# Patient Record
Sex: FEMALE | Race: WHITE | NOT HISPANIC OR LATINO | Employment: FULL TIME | URBAN - METROPOLITAN AREA
[De-identification: names, ages, dates, MRNs, and addresses within clinical notes are randomized per-mention and may not be internally consistent; named-entity substitution may affect disease eponyms.]

---

## 2017-01-30 ENCOUNTER — OFFICE VISIT - RIVER FALLS (OUTPATIENT)
Dept: FAMILY MEDICINE | Facility: CLINIC | Age: 22
End: 2017-01-30

## 2017-04-03 ENCOUNTER — OFFICE VISIT - RIVER FALLS (OUTPATIENT)
Dept: FAMILY MEDICINE | Facility: CLINIC | Age: 22
End: 2017-04-03

## 2017-04-03 ASSESSMENT — MIFFLIN-ST. JEOR: SCORE: 1587.5

## 2017-04-10 ENCOUNTER — TRANSFERRED RECORDS (OUTPATIENT)
Dept: HEALTH INFORMATION MANAGEMENT | Facility: CLINIC | Age: 22
End: 2017-04-10

## 2017-04-10 LAB — HPV ABSTRACT: ABNORMAL

## 2017-04-17 ENCOUNTER — OFFICE VISIT - RIVER FALLS (OUTPATIENT)
Dept: FAMILY MEDICINE | Facility: CLINIC | Age: 22
End: 2017-04-17

## 2017-06-28 ENCOUNTER — OFFICE VISIT - RIVER FALLS (OUTPATIENT)
Dept: FAMILY MEDICINE | Facility: CLINIC | Age: 22
End: 2017-06-28

## 2017-06-28 ASSESSMENT — MIFFLIN-ST. JEOR: SCORE: 1552.35

## 2017-09-21 ENCOUNTER — OFFICE VISIT - RIVER FALLS (OUTPATIENT)
Dept: FAMILY MEDICINE | Facility: CLINIC | Age: 22
End: 2017-09-21

## 2017-09-21 ASSESSMENT — MIFFLIN-ST. JEOR: SCORE: 1418.76

## 2017-10-24 ENCOUNTER — AMBULATORY - RIVER FALLS (OUTPATIENT)
Dept: FAMILY MEDICINE | Facility: CLINIC | Age: 22
End: 2017-10-24

## 2018-05-14 ENCOUNTER — OFFICE VISIT - RIVER FALLS (OUTPATIENT)
Dept: FAMILY MEDICINE | Facility: CLINIC | Age: 23
End: 2018-05-14

## 2018-05-14 ASSESSMENT — MIFFLIN-ST. JEOR: SCORE: 1552.92

## 2019-06-03 ENCOUNTER — OFFICE VISIT - RIVER FALLS (OUTPATIENT)
Dept: FAMILY MEDICINE | Facility: CLINIC | Age: 24
End: 2019-06-03

## 2019-06-03 ASSESSMENT — MIFFLIN-ST. JEOR: SCORE: 1532.96

## 2020-07-15 ENCOUNTER — OFFICE VISIT - RIVER FALLS (OUTPATIENT)
Dept: FAMILY MEDICINE | Facility: CLINIC | Age: 25
End: 2020-07-15

## 2020-07-15 ASSESSMENT — MIFFLIN-ST. JEOR: SCORE: 1586.37

## 2020-07-16 ENCOUNTER — COMMUNICATION - RIVER FALLS (OUTPATIENT)
Dept: FAMILY MEDICINE | Facility: CLINIC | Age: 25
End: 2020-07-16

## 2020-07-16 LAB
BUN SERPL-MCNC: 7 MG/DL (ref 7–25)
BUN/CREAT RATIO - HISTORICAL: NORMAL (ref 6–22)
CALCIUM SERPL-MCNC: 9 MG/DL (ref 8.6–10.2)
CHLAMYDIA TRACHOMATIS RNA, TMA - QUEST: NOT DETECTED
CHLORIDE BLD-SCNC: 104 MMOL/L (ref 98–110)
CO2 SERPL-SCNC: 29 MMOL/L (ref 20–32)
CREAT SERPL-MCNC: 0.69 MG/DL (ref 0.5–1.1)
EGFRCR SERPLBLD CKD-EPI 2021: 121 ML/MIN/1.73M2
GLUCOSE BLD-MCNC: 83 MG/DL (ref 65–139)
NEISSERIA GONORRHOEAE RNA TMA: NOT DETECTED
POTASSIUM BLD-SCNC: 4.2 MMOL/L (ref 3.5–5.3)
SODIUM SERPL-SCNC: 139 MMOL/L (ref 135–146)

## 2020-11-02 ENCOUNTER — OFFICE VISIT - RIVER FALLS (OUTPATIENT)
Dept: FAMILY MEDICINE | Facility: CLINIC | Age: 25
End: 2020-11-02

## 2020-11-02 ENCOUNTER — COMMUNICATION - RIVER FALLS (OUTPATIENT)
Dept: FAMILY MEDICINE | Facility: CLINIC | Age: 25
End: 2020-11-02

## 2020-11-03 ENCOUNTER — AMBULATORY - RIVER FALLS (OUTPATIENT)
Dept: FAMILY MEDICINE | Facility: CLINIC | Age: 25
End: 2020-11-03

## 2021-06-29 ENCOUNTER — OFFICE VISIT - RIVER FALLS (OUTPATIENT)
Dept: FAMILY MEDICINE | Facility: CLINIC | Age: 26
End: 2021-06-29

## 2021-06-29 ENCOUNTER — AMBULATORY - RIVER FALLS (OUTPATIENT)
Dept: FAMILY MEDICINE | Facility: CLINIC | Age: 26
End: 2021-06-29

## 2021-06-29 ENCOUNTER — COMMUNICATION - RIVER FALLS (OUTPATIENT)
Dept: FAMILY MEDICINE | Facility: CLINIC | Age: 26
End: 2021-06-29

## 2021-06-30 LAB — SARS-COV-2 RNA RESP QL NAA+PROBE: POSITIVE

## 2021-09-13 ENCOUNTER — OFFICE VISIT - RIVER FALLS (OUTPATIENT)
Dept: FAMILY MEDICINE | Facility: CLINIC | Age: 26
End: 2021-09-13

## 2021-09-13 ASSESSMENT — MIFFLIN-ST. JEOR: SCORE: 1562.1

## 2021-09-14 LAB
BUN SERPL-MCNC: 8 MG/DL (ref 7–25)
BUN/CREAT RATIO - HISTORICAL: ABNORMAL (ref 6–22)
CALCIUM SERPL-MCNC: 10 MG/DL (ref 8.6–10.2)
CHLORIDE BLD-SCNC: 102 MMOL/L (ref 98–110)
CO2 SERPL-SCNC: 27 MMOL/L (ref 20–32)
CREAT SERPL-MCNC: 0.64 MG/DL (ref 0.5–1.1)
EGFRCR SERPLBLD CKD-EPI 2021: 123 ML/MIN/1.73M2
ERYTHROCYTE [DISTWIDTH] IN BLOOD BY AUTOMATED COUNT: 13.1 % (ref 11–15)
GLUCOSE BLD-MCNC: 95 MG/DL (ref 65–99)
HCT VFR BLD AUTO: 42.8 % (ref 35–45)
HGB BLD-MCNC: 14 GM/DL (ref 11.7–15.5)
MCH RBC QN AUTO: 28.6 PG (ref 27–33)
MCHC RBC AUTO-ENTMCNC: 32.7 GM/DL (ref 32–36)
MCV RBC AUTO: 87.3 FL (ref 80–100)
PLATELET # BLD AUTO: 372 10*3/UL (ref 140–400)
PMV BLD: 10.9 FL (ref 7.5–12.5)
POTASSIUM BLD-SCNC: 5.4 MMOL/L (ref 3.5–5.3)
RBC # BLD AUTO: 4.9 10*6/UL (ref 3.8–5.1)
SODIUM SERPL-SCNC: 139 MMOL/L (ref 135–146)
TSH SERPL DL<=0.005 MIU/L-ACNC: 1.26 MIU/L
WBC # BLD AUTO: 9.1 10*3/UL (ref 3.8–10.8)

## 2021-09-15 ENCOUNTER — COMMUNICATION - RIVER FALLS (OUTPATIENT)
Dept: FAMILY MEDICINE | Facility: CLINIC | Age: 26
End: 2021-09-15

## 2021-09-30 ENCOUNTER — AMBULATORY - RIVER FALLS (OUTPATIENT)
Dept: FAMILY MEDICINE | Facility: CLINIC | Age: 26
End: 2021-09-30

## 2021-10-01 ENCOUNTER — COMMUNICATION - RIVER FALLS (OUTPATIENT)
Dept: FAMILY MEDICINE | Facility: CLINIC | Age: 26
End: 2021-10-01

## 2021-10-01 LAB — POTASSIUM BLD-SCNC: 4.8 MMOL/L (ref 3.5–5.3)

## 2021-10-11 ENCOUNTER — COMMUNICATION - RIVER FALLS (OUTPATIENT)
Dept: FAMILY MEDICINE | Facility: CLINIC | Age: 26
End: 2021-10-11

## 2021-10-14 ENCOUNTER — AMBULATORY - RIVER FALLS (OUTPATIENT)
Dept: FAMILY MEDICINE | Facility: CLINIC | Age: 26
End: 2021-10-14

## 2021-10-20 ENCOUNTER — OFFICE VISIT - RIVER FALLS (OUTPATIENT)
Dept: FAMILY MEDICINE | Facility: CLINIC | Age: 26
End: 2021-10-20

## 2021-10-21 ENCOUNTER — COMMUNICATION - RIVER FALLS (OUTPATIENT)
Dept: FAMILY MEDICINE | Facility: CLINIC | Age: 26
End: 2021-10-21

## 2021-10-21 LAB
BUN SERPL-MCNC: 10 MG/DL (ref 7–25)
BUN/CREAT RATIO - HISTORICAL: NORMAL (ref 6–22)
CALCIUM SERPL-MCNC: 9.2 MG/DL (ref 8.6–10.2)
CHLORIDE BLD-SCNC: 101 MMOL/L (ref 98–110)
CO2 SERPL-SCNC: 29 MMOL/L (ref 20–32)
CREAT SERPL-MCNC: 0.73 MG/DL (ref 0.5–1.1)
EGFRCR SERPLBLD CKD-EPI 2021: 114 ML/MIN/1.73M2
ERYTHROCYTE [DISTWIDTH] IN BLOOD BY AUTOMATED COUNT: 12.7 % (ref 11–15)
GLUCOSE BLD-MCNC: 89 MG/DL (ref 65–99)
HCT VFR BLD AUTO: 41 % (ref 35–45)
HGB BLD-MCNC: 13.6 GM/DL (ref 11.7–15.5)
MCH RBC QN AUTO: 29.7 PG (ref 27–33)
MCHC RBC AUTO-ENTMCNC: 33.2 GM/DL (ref 32–36)
MCV RBC AUTO: 89.5 FL (ref 80–100)
PLATELET # BLD AUTO: 274 10*3/UL (ref 140–400)
PMV BLD: 11.3 FL (ref 7.5–12.5)
POTASSIUM BLD-SCNC: 4.9 MMOL/L (ref 3.5–5.3)
RBC # BLD AUTO: 4.58 10*6/UL (ref 3.8–5.1)
SODIUM SERPL-SCNC: 137 MMOL/L (ref 135–146)
TSH SERPL DL<=0.005 MIU/L-ACNC: 1.46 MIU/L
WBC # BLD AUTO: 9.6 10*3/UL (ref 3.8–10.8)

## 2022-01-05 ENCOUNTER — COMMUNICATION - RIVER FALLS (OUTPATIENT)
Dept: FAMILY MEDICINE | Facility: CLINIC | Age: 27
End: 2022-01-05

## 2022-02-12 VITALS
HEIGHT: 64 IN | WEIGHT: 155.8 LBS | BODY MASS INDEX: 26.6 KG/M2 | DIASTOLIC BLOOD PRESSURE: 89 MMHG | SYSTOLIC BLOOD PRESSURE: 123 MMHG | HEART RATE: 101 BPM | OXYGEN SATURATION: 98 %

## 2022-02-12 VITALS
TEMPERATURE: 97.4 F | HEART RATE: 80 BPM | BODY MASS INDEX: 34.04 KG/M2 | WEIGHT: 195.2 LBS | SYSTOLIC BLOOD PRESSURE: 116 MMHG | DIASTOLIC BLOOD PRESSURE: 82 MMHG

## 2022-02-12 VITALS
HEIGHT: 63 IN | WEIGHT: 183.6 LBS | BODY MASS INDEX: 32.53 KG/M2 | SYSTOLIC BLOOD PRESSURE: 124 MMHG | DIASTOLIC BLOOD PRESSURE: 70 MMHG | HEART RATE: 70 BPM

## 2022-02-12 VITALS
HEIGHT: 63 IN | DIASTOLIC BLOOD PRESSURE: 62 MMHG | HEART RATE: 88 BPM | BODY MASS INDEX: 33.31 KG/M2 | WEIGHT: 188 LBS | SYSTOLIC BLOOD PRESSURE: 110 MMHG

## 2022-02-12 VITALS
TEMPERATURE: 98.8 F | HEART RATE: 80 BPM | BODY MASS INDEX: 31.63 KG/M2 | WEIGHT: 185.25 LBS | DIASTOLIC BLOOD PRESSURE: 72 MMHG | SYSTOLIC BLOOD PRESSURE: 116 MMHG | HEIGHT: 64 IN

## 2022-02-12 VITALS
WEIGHT: 193 LBS | SYSTOLIC BLOOD PRESSURE: 118 MMHG | HEART RATE: 88 BPM | DIASTOLIC BLOOD PRESSURE: 72 MMHG | BODY MASS INDEX: 32.95 KG/M2 | TEMPERATURE: 98.8 F | HEIGHT: 64 IN

## 2022-02-12 VITALS
HEART RATE: 88 BPM | SYSTOLIC BLOOD PRESSURE: 130 MMHG | BODY MASS INDEX: 32.68 KG/M2 | DIASTOLIC BLOOD PRESSURE: 68 MMHG | SYSTOLIC BLOOD PRESSURE: 122 MMHG | OXYGEN SATURATION: 99 % | HEART RATE: 97 BPM | HEIGHT: 64 IN | TEMPERATURE: 98 F | DIASTOLIC BLOOD PRESSURE: 72 MMHG | BODY MASS INDEX: 31.99 KG/M2 | WEIGHT: 187.4 LBS | OXYGEN SATURATION: 99 % | HEIGHT: 64 IN

## 2022-02-12 VITALS — BODY MASS INDEX: 32.79 KG/M2 | HEIGHT: 64 IN

## 2022-02-12 VITALS
DIASTOLIC BLOOD PRESSURE: 70 MMHG | SYSTOLIC BLOOD PRESSURE: 100 MMHG | BODY MASS INDEX: 32.61 KG/M2 | HEART RATE: 66 BPM | HEIGHT: 64 IN | WEIGHT: 191 LBS

## 2022-02-15 NOTE — TELEPHONE ENCOUNTER
---------------------  From: Marizol Piedra RN   Sent: 9/29/2021 3:27:33 PM CDT  Subject: labs     Time of Call:  1510  Return call at:1526     Person Calling:  patient  Phone number:      Note:   Patient dose not need to fast for lab on 9/30/21. Due for potassium.

## 2022-02-15 NOTE — TELEPHONE ENCOUNTER
---------------------  From: Lauren Rizvi CMA   Sent: 11/3/2020 2:20:07 PM CST  [facility]     Pt here for covid testing at [facility] today per Haley Esquivel. 02 Sat=98%. Specimen sent to [facility]. Faxed form to [facility].Correction: Specimen sent to state lab not [facility].

## 2022-02-15 NOTE — LETTER
(Inserted Image. Unable to display)   May 15, 2019      KEVIN VILLA   [place], [place] [id number]        Dear KEVIN,      Thank you for selecting [facility] (previously [facility]) for your healthcare needs.     Our records indicate you are due for the following services:     Annual Physical    To schedule an appointment or if you have further questions, please contact your primary clinic:   [facility]          [phone]   [facility]    [phone]             [facility]         [phone]      Powered by [employer]    Sincerely,    Geovanni Tyler M.D.

## 2022-02-15 NOTE — PROGRESS NOTES
Patient:   KEVIN VILLA            MRN: [id number]            FIN: [id number]               Age:   25 years     Sex:  Female     :  1995   Associated Diagnoses:   Close exposure to 2019-nCoV; Tick-borne disease   Author:   Robert ZAZUETA, Rishi ESPINOSA      Visit Information      Date of Service: 2021 01:05 pm  Performing Location: [facility]  Encounter#: [id number]      Primary Care Provider (PCP):  Geovanni Tyler MD    NPI# [id number]   Visit type:  Video Visit via [employer] or [employer].    Participants in room during visit:  1_   Location of patient:  _home  Location of provider:  _ (Clinic office   Video Start Time:  _140  Video End Time:   _141    Today's visit was conducted via video conference due to the COVID-19 pandemic.  The patient's consent to proceed with a video visit has been obtained and documented.      Chief Complaint   2021 1:27 PM CDT    Verbal consent given for a video visit.  Pt is c/o body aches,chills,headache,fatigue and lightheadedness x 5 days        History of Present Illness   Patient is a _25 year old _female who is being evaluated via a billable video visit.  5 day hx of body aches, chills, headache, fatigue, lightheadedness      she had Covid-19 in 2020  she completed the Pfizer Covid vaccine series in 2021  she is a CNA at [employer]    no known tick exposure,  [place]      Review of Systems   Constitutional:  Chills, Sweats, Fatigue, body aches.    Respiratory:  Negative.    Gastrointestinal:  Negative.    Neurologic:  Headache, lightheadness.       Health Status   Allergies:    Allergic Reactions (Selected)  No Known Medication Allergies   Medications:  (Selected)   Documented Medications  Documented  MulitVitamin: MulitVitamin, 0 Refill(s), Type: Maintenance   Problem list:    All Problems  ASCUS with positive high risk HPV cervical / SNOMED CT 4125576752 / Confirmed  Obesity / SNOMED CT 9069648469 /  Probable  Menarche / ICD-9-CM V21.8 / Confirmed      Histories   Past Medical History:    Active  Menarche (V21.8): Onset in the month of 3/2007 at 11 years  Obesity (0870676487)  ASCUS with positive high risk HPV cervical (5134128208)   Family History:    Squamous cell carcinoma  Grandfather (P) (General Family Hx)  Basal cell carcinoma  Grandfather (P) (General Family Hx)  Coronary artery disease  Grandfather (M) (General Family Hx)     Procedure history:    No active procedure history items have been selected or recorded.   Social History:        Electronic Cigarette/Vaping Assessment            Electronic Cigarette Use: Never.      Alcohol Assessment: Current            Beer (12 oz), Wine (5 oz), 1-2 times per week, 3 drinks/episode average.  4 drinks/episode maximum.      Tobacco Assessment: Denies Tobacco Use            Never            Never (less than 100 in lifetime)      Substance Abuse Assessment: Denies Substance Abuse            Never      Employment and Education Assessment            Employed.  Work/School description: CNA.  Activity level: Moderate physical work.      Home and Environment Assessment            Marital status: Single.  Lives with Family.  Injuries/Abuse/Neglect in household: No.  Feels unsafe at home:               No.  Family/Friends available for support: Yes.      Nutrition and Health Assessment            Type of diet: Regular.  Wants to lose weight: Yes.  Sleeping concerns: Yes.      Exercise and Physical Activity Assessment: Occasional exercise            Exercise duration: 30.  Exercise frequency: 3-4 times/week.  Exercise type: Running, Walking.      Sexual Assessment            Sexually active: No.  Sexual orientation: Heterosexual.  Other sexual concerns: Birth control.            Identifies as female, History of STD: Yes.  Contraceptive Use Details: Birth control pill.  History of sexual               abuse: No.      Other Assessment            First menses age 11.  Regular  menses, duration 3 days.  No history of abnormal Pap smear.        Physical Examination   General:  No acute distress.    Respiratory:  Respirations are non-labored.    Psychiatric:  Cooperative, Appropriate mood & affect, Normal judgment.       Impression and Plan   Diagnosis     Close exposure to 2019-nCoV (MLK59-BP Z20.822).     Tick-borne disease (APS50-OV B88.2).     Summary:  will treat as tick borne illness with doxycycline for 2 weeks  will also test for Covid (but seems unlikely due to prior infection and being vaccinated), no work until negative Covid in known  follow up if not improving.    Orders     Orders   Pharmacy:  doxycycline hyclate 100 mg oral tablet (Prescribe): = 1 tab(s) ( 100 mg ), PO, bid, x 14 day(s), Instructions: may substitute for cheapest form of doxycycline, # 28 tab(s), 0 Refill(s), Type: Maintenance, Pharmacy: [facility], 1 tab(s) Oral bid,x14 day(s),Instr:may substitute for cheapest form of doxycycline, 64, in, 11/2/2020 3:58 PM CST, Height Measured, 191, lb, 7/15/2020 11:05 AM CDT, Weight Measured.     Orders   Requests (Lab):  SARS-CoV-2 RNA (COVID-19), Qualitative NAAT (Request) (Order): Close exposure to 2019-nCoV.     Orders   Charges (Evaluation and Management):  50916 office o/p est low 20-29 min (Charge) (Order): Quantity: 1, Close exposure to 2019-nCoV  Tick-borne disease.

## 2022-02-15 NOTE — PROGRESS NOTES
Patient:   KEVIN VILLA            MRN: [id number]            FIN: [id number]               Age:   22 years     Sex:  Female     :  1995   Associated Diagnoses:   Well adult exam   Author:   Geovanni Tyler MD      Visit Information   Visit type:  Annual exam.    Source of history:  Self.    History limitation:  None.       Chief Complaint   2018 4:09 PM CDT    Annual Exam      Well Adult History   Well Adult History             The patient presents for well adult exam.  The patient's general health status is described as good.  would like to try switching ocp to see if effective for acne which has been worsening.  The patient's diet is described as balanced.  Exercise: routine.  Associated symptoms consist of none.  Medical encounters: none.        Review of Systems   ROS reviewed as documented in chart      Health Status   Allergies:    Allergic Reactions (Selected)  No Known Medication Allergies   Medications:  (Selected)   Prescriptions  Prescribed  drospirenone-ethinyl estradiol 3 mg-0.02 mg oral tablet: 1 tab(s), po, daily, # 84 tab(s), 4 Refill(s), [facility], 1 tab(s) po daily   Problem list:    All Problems  ASCUS with positive high risk HPV cervical / SNOMED CT 1658891719 / Confirmed  Menarche / ICD-9-CM V21.8 / Confirmed  Obesity / SNOMED CT 3649343627 / Probable  Canceled: Chicken Pox / ICD-9-.9      Histories   Past Medical History:    Active  Menarche (ICD-9-CM V21.8): Onset in the month of 3/2007 at 11 years   Family History:    Squamous cell carcinoma  Grandfather (P) (General Family Hx)  Basal cell carcinoma  Grandfather (P) (General Family Hx)  Coronary artery disease  Grandfather (M) (General Family Hx)     Procedure history:    No active procedure history items have been selected or recorded.   Social History:        Alcohol Assessment            2 x's per week      Tobacco Assessment: Denies Tobacco Use            Never      Substance  Abuse Assessment: Denies Substance Abuse            Never      Employment and Education Assessment            Student      Home and Environment Assessment            Lives with Roomate(s)/Friend(s).  Living situation: Home/Independent.      Nutrition and Health Assessment            Type of diet: Regular.      Exercise and Physical Activity Assessment: Occasional exercise            Exercise type: Walking.      Sexual Assessment            Sexually active: No.  Sexual orientation: Heterosexual.  Other sexual concerns: Birth control.      Other Assessment            First menses age 11.  Regular menses, duration 3 days.  No history of abnormal Pap smear.        Physical Examination   Last Menstrual Period: 4/21/2018   Vital Signs   5/14/2018 4:09 PM CDT Peripheral Pulse Rate 88 bpm    Pulse Site Radial artery    HR Method Manual    Systolic Blood Pressure 110 mmHg    Diastolic Blood Pressure 62 mmHg    Mean Arterial Pressure 78 mmHg    BP Site Left arm    BP Method Manual      Measurements from flowsheet : Measurements   5/14/2018 4:09 PM CDT Height Measured - Standard 62.75 in    Weight Measured - Standard 188 lb    BSA 1.94 m2    Body Mass Index 33.56 kg/m2  HI      General:  Alert and oriented, No acute distress.    Eye:  Pupils are equal, round and reactive to light, Extraocular movements are intact, Normal conjunctiva.    HENT:  Normocephalic, Tympanic membranes are clear, Oral mucosa is moist, No pharyngeal erythema.    Neck:  Supple, Non-tender, No lymphadenopathy, No thyromegaly.    Respiratory:  Lungs are clear to auscultation.    Cardiovascular:  Normal rate, Regular rhythm.    Breast:  No mass, No tenderness, No discharge.    Gastrointestinal:  Soft, Non-tender, Non-distended, No organomegaly.    Genitourinary:  Normal genitalia for age and sex, No urethral discharge, No lesions.         Perineum: Within normal limits.         Vagina: Within normal limits.         Uterus: Within normal limits.          Ovaries: Within normal limits.         Adnexa: Within normal limits.    Musculoskeletal:  Normal range of motion, Normal strength.    Integumentary:  Warm, Dry, Pink, No rash, no concerning lesions.    Neurologic:  Alert, Oriented, Normal sensory.    Psychiatric:  Cooperative, Appropriate mood & affect.       Impression and Plan   Diagnosis     Well adult exam (NDZ55-GS Z00.00).     Course:  Progressing as expected.    Patient Instructions:       Counseled: Patient, BMI, diet, and exercise.    Orders     Orders (Selected)   Outpatient Orders  Ordered  Return to Clinic (Request): RFV: Annual Exam, Return in 1 year  Ordered (In Transit)  Chlamydia/Neisseria gonorrhoeae RNA, TMA* ([facility]): Specimen Type: Swab, Collection Date: 05/14/18 17:01:00 CDT  ThinPrep Imaging Pap reflex HPV mRNA E6/E7* ([facility]): Specimen Type: Pap, Collection Date: 05/14/18 16:49:00 CDT  Prescriptions  Prescribed  Ortho Tri-Cyclen oral tablet: 1 tab(s), PO, Daily, # 84 tab(s), 4 Refill(s), Type: Maintenance, [facility], 1 tab(s) po daily.

## 2022-02-15 NOTE — PROGRESS NOTES
Patient:   KEVIN VILLA            MRN: [id number]            FIN: [id number]               Age:   21 years     Sex:  Female     :  1995   Associated Diagnoses:   ASCUS with positive high risk HPV cervical   Author:   Geovanni Tyler MD      CC: abnormal pap follow up    Patient with first abnl pap -   ASCUS with HR HPV   See form for further history.       Physical Examination   Genitourinary:  No costovertebral angle tenderness, No inguinal tenderness, No urethral discharge, No lesions.       Procedure   Colposcopy procedure   Confirmed: patient.     Informed consent: signed by patient.     Preparation and technique: placed in lithotomy position, vaginal speculum inserted, colposcope placed, cervix inspected, cervix swabbed (with saline solution, with acetic acid solution), cervix re-inspected color changes after acetic acid applied from red to white.     Findings: abnormal colposcopic lesion noted (with white epithelium (with no visible vessels, with sharp border, visible after acetic acid applied), not with atypical blood vessels).     Procedure tolerated: well.     Complications: none.        Impression and Plan   Diagnosis     ASCUS with positive high risk HPV cervical (HIR03-NZ R87.610).     Orders     Orders   Requests (Return to Office):  Return to Clinic (Request) (Modify): RFV: Annual exam with pap smear to follow up ASCUS, Return in 1 year.

## 2022-02-15 NOTE — TELEPHONE ENCOUNTER
---------------------  From: Kirk Ybarra MD   To: KEVIN VILLA    Sent: 10/21/2021 11:00:37 AM CDT  Subject: General Message     Your blood tests are looking good      Results:  Date Result Name Value Ref Range   10/20/2021 10:06 AM Glucose Level 89 mg/dL (65 - 99)   10/20/2021 10:06 AM BUN 10 mg/dL (7 - 25)   10/20/2021 10:06 AM Creatinine Level 0.73 mg/dL (0.50 - 1.10)   10/20/2021 10:06 AM eGFR 114 mL/min/1.73m2 (> OR = 60 - )   10/20/2021 10:06 AM eGFR African American 132 mL/min/1.73m2 (> OR = 60 - )   10/20/2021 10:06 AM BUN/Creat Ratio NOT APPLICABLE (6 - 22)   10/20/2021 10:06 AM Sodium Level 137 mmol/L (135 - 146)   10/20/2021 10:06 AM Potassium Level 4.9 mmol/L (3.5 - 5.3)   10/20/2021 10:06 AM Chloride Level 101 mmol/L (98 - 110)   10/20/2021 10:06 AM CO2 Level 29 mmol/L (20 - 32)   10/20/2021 10:06 AM Calcium Level 9.2 mg/dL (8.6 - 10.2)   10/20/2021 10:06 AM WBC 9.6 (3.8 - 10.8)   10/20/2021 10:06 AM RBC 4.58 (3.80 - 5.10)   10/20/2021 10:06 AM Hgb 13.6 gm/dL (11.7 - 15.5)   10/20/2021 10:06 AM Hct 41.0 % (35.0 - 45.0)   10/20/2021 10:06 AM MCV 89.5 fL (80.0 - 100.0)   10/20/2021 10:06 AM MCH 29.7 pg (27.0 - 33.0)   10/20/2021 10:06 AM MCHC 33.2 gm/dL (32.0 - 36.0)   10/20/2021 10:06 AM RDW 12.7 % (11.0 - 15.0)   10/20/2021 10:06 AM Platelet 274 (140 - 400)   10/20/2021 10:06 AM MPV 11.3 fL (7.5 - 12.5)   10/20/2021 10:06 AM TSH 1.46 mIU/L

## 2022-02-15 NOTE — TELEPHONE ENCOUNTER
---------------------  From: Geovanni Tyler MD   To: SWATI Burnett [place] ([id number]); KEVIN VILLA    Sent: 9/15/2021 10:01:01 AM CDT  Subject: lab results     Kevin  Labs are listed below. Potassium is just above normal. This is not likely significant, but I would like for you to recheck a potassium again in 3-4 weeks to make sure that it is stable. Otherwise, labs are all normal. TSH is in range. Let me know what questions you have.  Felecia Tyler      Results:  Date Result Name Ind Value Ref Range   9/13/2021 1:13 PM Sodium Level  139 mmol/L (135 - 146)   9/13/2021 1:13 PM Potassium Level ((H)) 5.4 mmol/L (3.5 - 5.3)   9/13/2021 1:13 PM Chloride Level  102 mmol/L (98 - 110)   9/13/2021 1:13 PM CO2 Level  27 mmol/L (20 - 32)   9/13/2021 1:13 PM Glucose Level  95 mg/dL (65 - 99)   9/13/2021 1:13 PM BUN  8 mg/dL (7 - 25)   9/13/2021 1:13 PM Creatinine Level  0.64 mg/dL (0.50 - 1.10)   9/13/2021 1:13 PM BUN/Creat Ratio  NOT APPLICABLE (6 - 22)   9/13/2021 1:13 PM eGFR  123 mL/min/1.73m2 (> OR = 60 - )   9/13/2021 1:13 PM eGFR African American  143 mL/min/1.73m2 (> OR = 60 - )   9/13/2021 1:13 PM Calcium Level  10.0 mg/dL (8.6 - 10.2)   9/13/2021 1:13 PM TSH  1.26 mIU/L    9/13/2021 1:13 PM WBC  9.1 (3.8 - 10.8)   9/13/2021 1:13 PM RBC  4.90 (3.80 - 5.10)   9/13/2021 1:13 PM Hgb  14.0 gm/dL (11.7 - 15.5)   9/13/2021 1:13 PM Hct  42.8 % (35.0 - 45.0)   9/13/2021 1:13 PM MCV  87.3 fL (80.0 - 100.0)   9/13/2021 1:13 PM MCH  28.6 pg (27.0 - 33.0)   9/13/2021 1:13 PM MCHC  32.7 gm/dL (32.0 - 36.0)   9/13/2021 1:13 PM RDW  13.1 % (11.0 - 15.0)   9/13/2021 1:13 PM Platelet  372 (140 - 400)   9/13/2021 1:13 PM MPV  10.9 fL (7.5 - 12.5)

## 2022-02-15 NOTE — PROCEDURES
Accession Number:       [id number]  CLINICAL INFORMATION::     None given  LMP::     06/20/2020  PREV. PAP::     05/14/2018  PREV. BX::     NONE GIVEN  SOURCE::     [facility]  STATEMENT OF ADEQUACY::     Satisfactory for evaluation. Endocervical/transformation zone component present.  INTERPRETATION/RESULT::     Negative for intraepithelial lesion or malignancy.  INFECTION::     Fungal organisms morphologically consistent with Candida spp.  COMMENT::     This Pap test has been evaluated with computer assisted technology.  CYTOTECHNOLOGIST::     MUMTAZ HUFF(ASCP) CT Screening location: [place] [id number]  COMMENT:     See comment       EXPLANATORY NOTE:         The Pap is a screening test for cervical cancer. It is       not a diagnostic test and is subject to false negative       and false positive results. It is most reliable when a       satisfactory sample, regularly obtained, is submitted       with relevant clinical findings and history, and when       the Pap result is evaluated along with historic and       current clinical information.

## 2022-02-15 NOTE — TELEPHONE ENCOUNTER
---------------------  From: Shawna/Fang GLORIA (Phone Messages Pool ([id number]_[facility]))   Sent: 7/2/2021 3:02:53 PM CDT  Subject: General Message     Phone Message    PCP: SWATI    Time of Call: 1500    Phone Number: [phone]    Returned call at: n/a    Note: Patient calls stating that she tested positive for covid and is needing the result sent to her and it also needs to specify what test was done. She requests us to sent the note via [employer].     I wrote note and sent via portal.    Patient will call and let us know if she does not receive it via portal and will come pick it up at .

## 2022-02-15 NOTE — PROGRESS NOTES
Patient:   KEVIN VILLA            MRN: [id number]            FIN: [id number]               Age:   23 years     Sex:  Female     :  1995   Associated Diagnoses:   Well adult exam   Author:   Geovanni Tyler MD      Visit Information   Visit type:  Annual exam.    Source of history:  Self.    History limitation:  None.       Chief Complaint   6/3/2019 10:16 AM CDT    Physical        Well Adult History   Well Adult History             The patient presents for well adult exam.  The patient's general health status is described as good.  The patient's diet is described as balanced.  Associated symptoms consist of none.  Medical encounters: none.     Patient with hx of ASCUS with +HPV. Most recent pap last year normal. Plan recheck in one year. Due for OCP refill, working well.      Review of Systems   ROS reviewed as documented in chart      Health Status   Allergies:    Allergic Reactions (Selected)  No Known Medication Allergies   Medications:  (Selected)   Prescriptions  Prescribed  Tri-Linyah 35 mcg oral tablet: 1 tab(s), Oral, daily, # 84 tab(s), 4 Refill(s), Type: Maintenance, [facility], 1 tab(s) Oral daily   Problem list:    All Problems  ASCUS with positive high risk HPV cervical / SNOMED CT 3078682110 / Confirmed  Menarche / ICD-9-CM V21.8 / Confirmed  Obesity / SNOMED CT 6905117789 / Probable  Canceled: Chicken Pox / ICD-9-.9      Histories   Past Medical History:    Active  Menarche (ICD-9-CM V21.8): Onset in the month of 3/2007 at 11 years  Obesity (SNOMED CT 2527622106)  ASCUS with positive high risk HPV cervical (SNOMED CT 7691881991)   Family History:    Squamous cell carcinoma  Grandfather (P) (General Family Hx)  Basal cell carcinoma  Grandfather (P) (General Family Hx)  Coronary artery disease  Grandfather (M) (General Family Hx)     Procedure history:    No active procedure history items have been selected or recorded.   Social History:         Alcohol Assessment: Current            2 x's per week, 3 drinks/episode average.      Tobacco Assessment: Denies Tobacco Use            Never      Substance Abuse Assessment: Denies Substance Abuse            Never      Employment and Education Assessment            Work/School description: .      Home and Environment Assessment            Lives with Roomate(s)/Friend(s).  Living situation: Home/Independent.      Nutrition and Health Assessment            Type of diet: Regular.      Exercise and Physical Activity Assessment: Occasional exercise            Exercise type: Walking.      Sexual Assessment            Sexually active: No.  Sexual orientation: Heterosexual.  Other sexual concerns: Birth control.      Other Assessment            First menses age 11.  Regular menses, duration 3 days.  No history of abnormal Pap smear.      Physical Examination   Vital Signs   6/3/2019 10:16 AM CDT Peripheral Pulse Rate 70 bpm    HR Method Manual    Systolic Blood Pressure 124 mmHg    Diastolic Blood Pressure 70 mmHg    Mean Arterial Pressure 88 mmHg    BP Method Manual      Measurements from flowsheet : Measurements   6/3/2019 10:16 AM CDT Height Measured - Standard 62.75 in    Weight Measured - Standard 183.6 lb    BSA 1.92 m2    Body Mass Index 32.78 kg/m2  HI      General:  Alert and oriented, No acute distress.    Eye:  Pupils are equal, round and reactive to light, Extraocular movements are intact, Normal conjunctiva.    HENT:  Normocephalic, Tympanic membranes are clear, Oral mucosa is moist, No pharyngeal erythema.    Neck:  Supple, Non-tender, No lymphadenopathy, No thyromegaly.    Respiratory:  Lungs are clear to auscultation.    Cardiovascular:  Normal rate, Regular rhythm.    Breast:  No mass, No tenderness, No discharge.    Gastrointestinal:  Soft, Non-tender, Non-distended, No organomegaly.    Musculoskeletal:  Normal range of motion, Normal strength.    Integumentary:  Warm, Dry, Pink, No  rash, no concerning lesions.    Neurologic:  Alert, Oriented, Normal sensory.    Psychiatric:  Cooperative, Appropriate mood & affect.       Impression and Plan   Diagnosis     Well adult exam (PDC98-BE Z00.00).     Course:  Progressing as expected.    Patient Instructions:       Counseled: Patient, BMI, diet, and exercise.    Orders     Orders (Selected)   Outpatient Orders  Ordered  Return to Clinic (Request): RFV: Annual px and PAP w\HPV, Return in 1 year  Prescriptions  Prescribed  Tri-Linyah 35 mcg oral tablet: 1 tab(s), Oral, daily, # 84 tab(s), 4 Refill(s), Type: Maintenance, [facility], 1 tab(s) Oral daily.

## 2022-02-15 NOTE — NURSING NOTE
CAGE Assessment Entered On:  6/3/2019 10:42 AM CDT    Performed On:  6/3/2019 10:42 AM CDT by Negra Rangel CMA               Assessment   Have you ever felt you should cut down on your drinking :   No   Have people annoyed you by criticizing your drinking :   No   Have you ever felt bad or guilty about your drinking :   No   Have you ever taken a drink first thing in the morning to steady your nerves or get rid of a hangover (Eye-opener) :   No   CAGE Score :   0    Negra Rangel CMA - 6/3/2019 10:42 AM CDT

## 2022-02-15 NOTE — LETTER
(Inserted Image. Unable to display)   [address] [place] [address] [phone] (phone) [phone] (fax)  October 01, 2021      KEVIN VILLA      [address] [address]      Dear KEVIN,    Thank you for selecting [facility] for your healthcare needs. Below you will find the results of the recent tests done at our clinic.     Your lab results are normal. I sent a copy through the portal as well.     Result Name Current Result Previous Result Reference Range   Potassium Level (mmol/L)  4.8 9/30/2021 ((H)) 5.4 9/13/2021 3.5 - 5.3       Please contact me or my assistant at [phone] if you have any questions or concerns.     Sincerely,        Geovanni Tyler M.D.

## 2022-02-15 NOTE — TELEPHONE ENCOUNTER
---------------------  From: Lauren Rizvi CMA   Sent: 6/29/2021 3:26:35 PM CDT  Subject: [facility] Testing     Pt was seen at [facility] today per Rishi Jones. No 02 Sat taken today. Pt resides in [facility]-will fax results to Public Health if positive.

## 2022-02-15 NOTE — NURSING NOTE
Comprehensive Intake Entered On:  11/2/2020 4:05 PM CST    Performed On:  11/2/2020 3:58 PM CST by Gloria Gomez LPN               Summary   Chief Complaint :   having symptoms of COVID, exposure to family member that tested positive, lives in same house, patient tested negative last week, fever, cough, HA, myalgia, fatigue, stuffy nose - verbal consent for video visit   Menstrual Status :   Menarcheal   Height Measured :   64 in(Converted to: 5 ft 4 in, 162.56 cm)    Gloria Goemz LPN - 11/2/2020 3:58 PM CST   Health Status   Allergies Verified? :   Yes   Medication History Verified? :   Yes   Immunizations Current :   Yes   Medical History Verified? :   No   Pre-Visit Planning Status :   Completed   Tobacco Use? :   Never smoker   Gloria Gomez LPN - 11/2/2020 3:58 PM CST   Meds / Allergies   (As Of: 11/2/2020 4:05:00 PM CST)   Allergies (Active)   No Known Medication Allergies  Estimated Onset Date:   Unspecified ; Created By:   Genesis Ash LPN; Reaction Status:   Active ; Category:   Drug ; Substance:   No Known Medication Allergies ; Type:   Allergy ; Updated By:   Genesis Ash LPN; Reviewed Date:   7/15/2020 11:07 AM CDT        Medication List   (As Of: 11/2/2020 4:05:00 PM CST)   Prescription/Discharge Order    spironolactone  :   spironolactone ; Status:   Prescribed ; Ordered As Mnemonic:   spironolactone 50 mg oral tablet ; Simple Display Line:   50 mg, 1 tab(s), Oral, bid, 180 tab(s), 3 Refill(s) ; Ordering Provider:   Geovanni Tyler MD; Catalog Code:   spironolactone ; Order Dt/Tm:   7/15/2020 11:23:10 AM CDT            Home Meds    ISOtretinoin  :   ISOtretinoin ; Status:   Documented ; Ordered As Mnemonic:   ISOtretinoin 40 mg oral capsule ; Simple Display Line:   40 mg, 1 cap(s), Oral, daily, 0 Refill(s) ; Catalog Code:   ISOtretinoin ; Order Dt/Tm:   11/2/2020 4:04:21 PM CST          Miscellaneous Prescription  :   Miscellaneous Prescription ; Status:   Documented ; Ordered As  Mnemonic:   MulitVitamin ; Simple Display Line:   0 Refill(s) ; Catalog Code:   Miscellaneous Prescription ; Order Dt/Tm:   7/15/2020 11:09:06 AM CDT          ethinyl estradiol-norgestimate  :   ethinyl estradiol-norgestimate ; Status:   Documented ; Ordered As Mnemonic:   Tri-Linyah ; Simple Display Line:   Oral, daily, 0 Refill(s) ; Catalog Code:   ethinyl estradiol-norgestimate ; Order Dt/Tm:   11/2/2020 4:00:29 PM CST            ID Risk Screen   Recent Travel History :   No recent travel   Family Member Travel History :   No recent travel   Other Exposure to Infectious Disease :   Community exposure to COVID-19 within the last 14 days   Gloria Gomez LPN - 11/2/2020 3:58 PM CST

## 2022-02-15 NOTE — PROGRESS NOTES
Called and spoke to pt about positive covid results. Pt wasn't surprised as her whole household is becoming positive. Was told that [facility] would be contacting her within the next couple of days. Pt understood and had no questions.    AES

## 2022-02-15 NOTE — TELEPHONE ENCOUNTER
---------------------  From: Lolita Quezada RN   Sent: 6/30/2021 2:17:31 PM CDT  Subject: COVID Results     Called patient and informed her of POSITIVE COVID results. She has heard from [facility] - they are wanting her to wait 24 hours and test again to determine if it was a false positive. She is unsure if she will be testing through the [facility] - will call back if she is needing testing done here.

## 2022-02-15 NOTE — NURSING NOTE
Comprehensive Intake Entered On:  6/3/2019 10:20 AM CDT    Performed On:  6/3/2019 10:16 AM CDT by Negra Rangel CMA               Summary   Chief Complaint :   Physical    Menstrual Status :   Menarcheal   Weight Measured :   183.6 lb(Converted to: 183 lb 10 oz, 83.28 kg)    Height Measured :   62.75 in(Converted to: 5 ft 3 in, 159.38 cm)    Body Mass Index :   32.78 kg/m2 (HI)    Body Surface Area :   1.92 m2   Systolic Blood Pressure :   124 mmHg   Diastolic Blood Pressure :   70 mmHg   Mean Arterial Pressure :   88 mmHg   Peripheral Pulse Rate :   70 bpm   BP Method :   Manual   HR Method :   Manual   Negra Rangel CMA - 6/3/2019 10:16 AM CDT   Health Status   Allergies Verified? :   Yes   Medication History Verified? :   Yes   Immunizations Current :   Yes   Medical History Verified? :   Yes   Pre-Visit Planning Status :   Completed   Tobacco Use? :   Never smoker   Negra Rangel CMA - 6/3/2019 10:16 AM CDT   Consents   Consent for Immunization Exchange :   Consent Granted   Consent for Immunizations to Providers :   Consent Granted   Negra Rangel CMA - 6/3/2019 10:16 AM CDT   Meds / Allergies   (As Of: 6/3/2019 10:20:34 AM CDT)   Allergies (Active)   No Known Medication Allergies  Estimated Onset Date:   Unspecified ; Created By:   Genesis Ash LPN; Reaction Status:   Active ; Category:   Drug ; Substance:   No Known Medication Allergies ; Type:   Allergy ; Updated By:   Genesis Ash LPN; Reviewed Date:   6/3/2019 10:18 AM CDT        Medication List   (As Of: 6/3/2019 10:20:34 AM CDT)   Prescription/Discharge Order    ethinyl estradiol-norgestimate  :   ethinyl estradiol-norgestimate ; Status:   Prescribed ; Ordered As Mnemonic:   Tri-Linyah 35 mcg oral tablet ; Simple Display Line:   1 tab(s), Oral, daily, 28 tab(s), 0 Refill(s) ; Ordering Provider:   Geovanni Tyler MD; Catalog Code:   ethinyl estradiol-norgestimate ; Order Dt/Tm:   5/23/2019 8:50:07 AM

## 2022-02-15 NOTE — NURSING NOTE
Comprehensive Intake Entered On:  10/20/2021 9:39 AM CDT    Performed On:  10/20/2021 9:37 AM CDT by Mei Tucker CMA               Summary   Chief Complaint :   Home sleep test 10/14/21   Menstrual Status :   Menarcheal   Height Measured :   63.5 in(Converted to: 5 ft 3 in, 161.29 cm)    Systolic Blood Pressure :   122 mmHg   Diastolic Blood Pressure :   72 mmHg   Mean Arterial Pressure :   89 mmHg   Peripheral Pulse Rate :   88 bpm   BP Site :   Right arm   BP Method :   Electronic   Oxygen Saturation :   99 %   Mei Tucker CMA - 10/20/2021 9:37 AM CDT   Health Status   Allergies Verified? :   Yes   Medication History Verified? :   Yes   Immunizations Current :   Yes   Medical History Verified? :   Yes   Pre-Visit Planning Status :   Completed   Mei Tucker CMA - 10/20/2021 9:37 AM CDT   Consents   Consent for Immunization Exchange :   Consent Granted   Consent for Immunizations to Providers :   Consent Granted   Mei Tucker CMA - 10/20/2021 9:37 AM CDT   Meds / Allergies   (As Of: 10/20/2021 9:39:18 AM CDT)   Allergies (Active)   No Known Medication Allergies  Estimated Onset Date:   Unspecified ; Created By:   Genesis Ash LPN; Reaction Status:   Active ; Category:   Drug ; Substance:   No Known Medication Allergies ; Type:   Allergy ; Updated By:   Genesis Ash LPN; Reviewed Date:   10/20/2021 9:37 AM CDT        Medication List   (As Of: 10/20/2021 9:39:18 AM CDT)   Prescription/Discharge Order    ethinyl estradiol-norgestimate  :   ethinyl estradiol-norgestimate ; Status:   Prescribed ; Ordered As Mnemonic:   Ortho Tri-Cyclen oral tablet ; Simple Display Line:   1 tab(s), PO, Daily, 84 tab(s), 4 Refill(s) ; Ordering Provider:   Geovanni Tyler MD; Catalog Code:   ethinyl estradiol-norgestimate ; Order Dt/Tm:   9/13/2021 1:05:39 PM CDT            Home Meds    Miscellaneous Prescription  :   Miscellaneous Prescription ; Status:   Documented ; Ordered As Mnemonic:   MulitVitamin ; Simple Display  Line:   0 Refill(s) ; Catalog Code:   Miscellaneous Prescription ; Order Dt/Tm:   7/15/2020 11:09:06 AM CDT            ID Risk Screen   Recent Travel History :   No recent travel   Family Member Travel History :   No recent travel   Other Exposure to Infectious Disease :   Unknown   COVID-19 Testing Status :   Positive COVID-19 test greater than 30 days ago   Mie Tucker CMA - 10/20/2021 9:37 AM CDT   Social History   Social History   (As Of: 10/20/2021 9:39:18 AM CDT)   Alcohol:  Current      Beer (12 oz), Wine (5 oz), 1-2 times per week, 3 drinks/episode average.  4 drinks/episode maximum.   (Last Updated: 6/6/2019 11:06:51 AM CDT by Nessa Gutierres)          Tobacco:  Denies Tobacco Use      Never   (Last Updated: 1/28/2014 1:19:55 PM CST by Nessa Gutierres)   Never (less than 100 in lifetime)   (Last Updated: 6/29/2021 1:29:53 PM CDT by Dimitry Shukla CMA)          Electronic Cigarette/Vaping:        Electronic Cigarette Use: Never.   (Last Updated: 6/29/2021 1:29:56 PM CDT by Dimitry Shukla CMA)          Substance Abuse:  Denies Substance Abuse      Never   (Last Updated: 10/4/2013 8:58:59 AM CDT by Genesis Ash LPN)          Employment/School:        Employed.  Work/School description: CNA.  Activity level: Moderate physical work.   (Last Updated: 7/15/2020 11:05:07 AM CDT by Negra Rangel CMA)          Home/Environment:        Marital status: Single.  Lives with Family.  Injuries/Abuse/Neglect in household: No.  Feels unsafe at home: No.  Family/Friends available for support: Yes.   (Last Updated: 7/15/2020 11:05:08 AM CDT by Negra Rangel CMA)          Nutrition/Health:        Type of diet: Regular.  Wants to lose weight: Yes.  Sleeping concerns: Yes.   (Last Updated: 7/15/2020 11:05:11 AM CDT by Negra Rangel CMA)          Exercise:  Occasional exercise      Exercise duration: 30.  Exercise frequency: 3-4 times/week.  Exercise type: Running, Walking.   (Last Updated: 7/15/2020 11:05:08 AM CDT by Juan Carlos  Negra ARCEO)          Sexual:        Sexually active: No.  Sexual orientation: Heterosexual.  Other sexual concerns: Birth control.   (Last Updated: 1/28/2014 1:20:16 PM CST by Nessa Gutierres)   Identifies as female, History of STD: Yes.  Contraceptive Use Details: Birth control pill.  History of sexual abuse: No.   (Last Updated: 6/6/2019 11:09:22 AM CDT by Nessa Gutierres)          Other:        First menses age 11.  Regular menses, duration 3 days.  No history of abnormal Pap smear.   (Last Updated: 1/28/2014 1:19:43 PM CST by Nessa Gutierres)

## 2022-02-15 NOTE — NURSING NOTE
Comprehensive Intake Entered On:  7/15/2020 11:11 AM CDT    Performed On:  7/15/2020 11:05 AM CDT by Negra Rangel CMA               Summary   Chief Complaint :   Px and PAP, not taking Birth Control pill would like to discuss other options   Last Menstrual Period :   6/20/2020 CDT   Menstrual Status :   Menarcheal   Weight Measured :   191.0 lb(Converted to: 191 lb 0 oz, 86.64 kg)    Height Measured :   64 in(Converted to: 5 ft 4 in, 162.56 cm)    Body Mass Index :   32.78 kg/m2 (HI)    Body Surface Area :   1.98 m2   Systolic Blood Pressure :   100 mmHg   Diastolic Blood Pressure :   70 mmHg   Mean Arterial Pressure :   80 mmHg   Peripheral Pulse Rate :   66 bpm   BP Method :   Manual   HR Method :   Manual   Negra Rangel CMA - 7/15/2020 11:05 AM CDT   Health Status   Allergies Verified? :   Yes   Medication History Verified? :   Yes   Immunizations Current :   Yes   Medical History Verified? :   Yes   Pre-Visit Planning Status :   Completed   Tobacco Use? :   Never smoker   Negra Rangel CMA - 7/15/2020 11:05 AM CDT   Consents   Consent for Immunization Exchange :   Consent Granted   Consent for Immunizations to Providers :   Consent Granted   Negra Rangel CMA - 7/15/2020 11:05 AM CDT   Meds / Allergies   (As Of: 7/15/2020 11:11:08 AM CDT)   Allergies (Active)   No Known Medication Allergies  Estimated Onset Date:   Unspecified ; Created By:   Genesis Ash LPN; Reaction Status:   Active ; Category:   Drug ; Substance:   No Known Medication Allergies ; Type:   Allergy ; Updated By:   Genesis Ash LPN; Reviewed Date:   7/15/2020 11:07 AM CDT        Medication List   (As Of: 7/15/2020 11:11:08 AM CDT)   Prescription/Discharge Order    ethinyl estradiol-norgestimate  :   ethinyl estradiol-norgestimate ; Status:   Prescribed ; Ordered As Mnemonic:   Tri-Linyah 35 mcg oral tablet ; Simple Display Line:   1 tab(s), Oral, daily, 84 tab(s), 4 Refill(s) ; Ordering Provider:   Geovanni Tyler MD; Catalog Code:    ethinyl estradiol-norgestimate ; Order Dt/Tm:   6/3/2019 10:34:06 AM CDT            Home Meds    Miscellaneous Prescription  :   Miscellaneous Prescription ; Status:   Documented ; Ordered As Mnemonic:   MulitVitamin ; Simple Display Line:   0 Refill(s) ; Catalog Code:   Miscellaneous Prescription ; Order Dt/Tm:   7/15/2020 11:09:06 AM CDT            ID Risk Screen   Recent Travel History :   No recent travel   Family Member Travel History :   No recent travel   Other Exposure to Infectious Disease :   Unknown   Negra Rangel CMA 7/15/2020 11:05 AM CDT

## 2022-02-15 NOTE — PROGRESS NOTES
Patient:   KEVIN VILLA            MRN: [id number]            FIN: [id number]               Age:   21 years     Sex:  Female     :  1995   Associated Diagnoses:   Infection of nail bed of toe of left foot   Author:   Geovanni Tyler MD      Chief Complaint   2017 2:43 PM CDT    Pt. presents with pain, inflamation, open sore and drainage @ L grt. toe. Several days. Also, check L heel , small scabbed area, possible puncture wound.      History of Present Illness   Chief complaint and symptoms reviewed with patient and confirmed as above. Very sore. Has had issues before with ingrown toenails. Has never needed removal.   Stepped on glass last week, punctured left heel. Sore to step. Would like it checked.       Health Status   Allergies:    Allergic Reactions (All)  No Known Medication Allergies  Canceled/Inactive Reactions (All)  No known allergies   Medications:  (Selected)   Prescriptions  Prescribed  drospirenone-ethinyl estradiol 3 mg-0.02 mg oral tablet: 1 tab(s), po, daily, # 84 tab(s), 4 Refill(s), [facility], 1 tab(s) po daily   Problem list:    All Problems  ASCUS with positive high risk HPV cervical / SNOMED CT 9741936109 / Confirmed  Menarche / ICD-9-CM V21.8 / Confirmed  Obesity / SNOMED CT 5439779335 / Probable      Histories   Past Medical History:    Active  Menarche (ICD-9-CM V21.8): Onset in the month of 3/2007 at 11 years   Procedure history:    No active procedure history items have been selected or recorded.   Social History:        Alcohol Assessment            2 x's per week      Tobacco Assessment: Denies Tobacco Use            Never      Substance Abuse Assessment: Denies Substance Abuse            Never      Employment and Education Assessment            Student      Home and Environment Assessment            Lives with Roomate(s)/Friend(s).  Living situation: Home/Independent.      Nutrition and Health Assessment            Type of diet:  Regular.      Exercise and Physical Activity Assessment: Occasional exercise            Exercise type: Walking.      Sexual Assessment            Sexually active: No.  Sexual orientation: Heterosexual.  Other sexual concerns: Birth control.      Other Assessment            First menses age 11.  Regular menses, duration 3 days.  No history of abnormal Pap smear.        Physical Examination   Vital Signs   6/28/2017 2:43 PM CDT Temperature Tympanic 98.8 DegF    Peripheral Pulse Rate 80 bpm    Pulse Site Radial artery    HR Method Manual    Systolic Blood Pressure 116 mmHg    Diastolic Blood Pressure 72 mmHg    Mean Arterial Pressure 87 mmHg    BP Site Right arm    BP Method Manual      Measurements from flowsheet : Measurements   6/28/2017 2:43 PM CDT Height Measured - Standard 63.5 in    Weight Measured - Standard 185.25 lb    BSA 1.94 m2    Body Mass Index 32.3 kg/m2      left great toenail with swelling and inflammation along the lateral toenail with purulent drainage; tenderness  on left heel puncture wound present, no evidence of foreign body         Health Maintenance      Recommendations     Pending (in the next year)        Due            Cervical Cancer Screen (if sexually active) due  06/28/17  and every 3  year(s)           Chlamydia Screen (if sexually active) due  06/28/17  and every 1  year(s)           Gonorrhea Screen (if sexually active) due  06/28/17  and every 1  year(s)        Near Due            Tetanus Vaccine near due  08/27/17  and every 10  year(s)        Due In Future            Alcohol Misuse Screen (Female) not due until  04/03/18  and every 1  year(s)           Depression Screen (Female) not due until  04/03/18  and every 1  year(s)     Satisfied (in the past 1 year)        Satisfied            Alcohol Misuse Screen (Female) on  04/03/17.           Body Mass Index Check (Female) on  06/28/17.           Body Mass Index Check (Female) on  04/03/17.           Depression Screen (Female) on   04/03/17.           Depression Screen (Female) on  04/03/17.           Depression Screen (Female) on  04/03/17.           High Blood Pressure Screen (Female) on  06/28/17.           High Blood Pressure Screen (Female) on  04/17/17.           High Blood Pressure Screen (Female) on  04/03/17.           High Blood Pressure Screen (Female) on  01/30/17.           Obesity Screen and Counseling (Female) on  06/28/17.           Obesity Screen and Counseling (Female) on  04/17/17.           Obesity Screen and Counseling (Female) on  04/03/17.           Obesity Screen and Counseling (Female) on  01/30/17.           Tobacco Use Screen (Female) on  06/28/17.           Tobacco Use Screen (Female) on  04/17/17.           Tobacco Use Screen (Female) on  04/03/17.           Tobacco Use Screen (Female) on  01/30/17.        Impression and Plan   Diagnosis     Infection of nail bed of toe of left foot (DPU00-BK L03.032).     Course:  Worsening.    Plan:  patient should soak foot, keep clean and dry. Start abx. If not resolving over the next week, follow up to consider removal of toenail.    Orders     Orders   Pharmacy:  Keflex 500 mg oral capsule (Prescribe): 1 cap(s) ( 500 mg ), PO, QID, x 10 day(s), # 40 cap(s), 0 Refill(s), Type: Maintenance, Pharmacy: [id number], 1 cap(s) po qid,x10 day(s).

## 2022-02-15 NOTE — PROGRESS NOTES
Patient:   KEVIN VILLA            MRN: [id number]            FIN: [id number]               Age:   21 years     Sex:  Female     :  1995   Associated Diagnoses:   Ingrown right big toenail   Author:   Geovanni Tyler MD      Chief Complaint   2017 2:53 PM CDT    Patinet here for ingrown toenail on right great toe.      History of Present Illness   Chief complaint and symptoms reviewed with patient and confirmed as above. Not as painful as prior issues with left great toe. Has had swelling, some purulent drainage. No fevers.         Health Status   Allergies:    Allergic Reactions (All)  No Known Medication Allergies  Canceled/Inactive Reactions (All)  No known allergies   Medications:  (Selected)   Prescriptions  Prescribed  drospirenone-ethinyl estradiol 3 mg-0.02 mg oral tablet: 1 tab(s), po, daily, # 84 tab(s), 4 Refill(s), [facility], 1 tab(s) po daily   Problem list:    All Problems  ASCUS with positive high risk HPV cervical / SNOMED CT 0157327033 / Confirmed  Menarche / ICD-9-CM V21.8 / Confirmed  Obesity / SNOMED CT 1094771865 / Probable      Histories   Family History:    Squamous cell carcinoma  Grandfather (P) (General Family Hx)  Basal cell carcinoma  Grandfather (P) (General Family Hx)  Coronary artery disease  Grandfather (M) (General Family Hx)     Procedure history:    No active procedure history items have been selected or recorded.   Social History:        Alcohol Assessment            2 x's per week      Tobacco Assessment: Denies Tobacco Use            Never      Substance Abuse Assessment: Denies Substance Abuse            Never      Employment and Education Assessment            Student      Home and Environment Assessment            Lives with Roomate(s)/Friend(s).  Living situation: Home/Independent.      Nutrition and Health Assessment            Type of diet: Regular.      Exercise and Physical Activity Assessment: Occasional exercise             Exercise type: Walking.      Sexual Assessment            Sexually active: No.  Sexual orientation: Heterosexual.  Other sexual concerns: Birth control.      Other Assessment            First menses age 11.  Regular menses, duration 3 days.  No history of abnormal Pap smear.        Physical Examination   Vital Signs   9/21/2017 2:53 PM CDT Peripheral Pulse Rate 101 bpm  HI    Systolic Blood Pressure 123 mmHg    Diastolic Blood Pressure 89 mmHg    Mean Arterial Pressure 100 mmHg    Oxygen Saturation 98 %      Measurements from flowsheet : Measurements   9/21/2017 2:53 PM CDT Height Measured - Standard 63.5 in    Weight Measured - Standard 155.8 lb    BSA 1.78 m2    Body Mass Index 27.16 kg/m2      General:  Alert and oriented, No acute distress.    Integumentary:  lateral aspect of great toenail is swollen, purulent drainage is present.       Impression and Plan   Diagnosis     Ingrown right big toenail ([id number] L60.0).     Plan:  prior problems resolved well with abx alone; will try that at this time and patient will follow up next week if not improving.    Orders     Orders (Selected)   Prescriptions  Prescribed  Keflex 500 mg oral capsule: 1 cap(s) ( 500 mg ), PO, QID, # 40 cap(s), 0 Refill(s), Type: Maintenance, Pharmacy: [facility] #[id number], 1 cap(s) po qid,x10 day(s).

## 2022-02-15 NOTE — NURSING NOTE
Depression Screening Entered On:  6/3/2019 10:42 AM CDT    Performed On:  6/3/2019 10:41 AM CDT by Negra Rangel CMA               Depression Screening   Little Interest - Pleasure in Activities :   Several days   Feeling Down, Depressed, Hopeless :   Not at all   Initial Depression Screen Score :   1    Trouble Falling or Staying Asleep :   Not at all   Feeling Tired or Little Energy :   Several days   Poor Appetite or Overeating :   Not at all   Feeling Bad About Yourself :   Not at all   Trouble Concentrating :   Not at all   Moving or Speaking Slowly :   Not at all   Thoughts Better Off Dead or Hurting Self :   Not at all   Detailed Depression Screen Score :   1    Total Depression Screen Score :   2    ASHISH Difficulty with Work, Home, Others :   Not difficult at all   Negra Rangel CMA - 6/3/2019 10:41 AM CDT

## 2022-02-15 NOTE — PROGRESS NOTES
Patient:   KEVIN VILLA            MRN: [id number]            FIN: [id number]               Age:   24 years     Sex:  Female     :  1995   Associated Diagnoses:   Close exposure to 2019 novel coronavirus; Cough; Fever; Headache   Author:   Haley Lemus      Visit Information      Date of Service: 2020 02:13 pm  Performing Location: [facility]  Encounter#: [id number]      Primary Care Provider (PCP):  Geovanni Tyler MD    NPI# [id number]      Referring Provider:  Haley Lemus    NPI# [id number]   Visit type:  video.    Participants in room during visit:  _pt   Location of patient:  _home  Location of provider:  _ clinic  Video Start Time:  4:21  Video End Time:   _4:31    Today's visit was conducted via video conference due to the COVID-19 pandemic.  The patient's consent to proceed with a video visit has been obtained and documented.      Chief Complaint   2020 3:58 PM CST    having symptoms of COVID, exposure to family member that tested positive, lives in same house, patient tested negative last week, fever, cough, HA, myalgia, fatigue, stuffy nose - verbal consent for video visit        History of Present Illness   Patient is a 24_ year old _female who is being evaluated via a billable video visit.    she has cough , congestion, low grade fever, body aches, fatigue, HA  onset of symptoms yesterday; no asthma. 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           Health Status   Allergies:    Allergic Reactions (Selected)  No Known Medication Allergies   Medications:  (Selected)   Prescriptions  Prescribed  spironolactone 50 mg oral tablet: = 1 tab(s) ( 50 mg ), Oral, bid, # 180 tab(s), 3 Refill(s), Type: Maintenance, [facility], 1 tab(s) Oral bid, 64, in, 07/15/20 11:05:00 CDT, Height  Measured, 191, lb, 07/15/20 11:05:00 CDT, Weight Measured  Documented Medications  Documented  ISOtretinoin 40 mg oral capsule: = 1 cap(s) ( 40 mg ), Oral, daily, 0 Refill(s), Type: Maintenance  MulitVitamin: MulitVitamin, 0 Refill(s), Type: Maintenance  Tri-Linyah: Oral, daily, 0 Refill(s), Type: Maintenance,    Medications          *denotes recorded medication          *ISOtretinoin 40 mg oral capsule: 40 mg, 1 cap(s), Oral, daily, 0 Refill(s).          *MulitVitamin: 0 Refill(s).          *Tri-Linyah: Oral, daily, 0 Refill(s).          spironolactone 50 mg oral tablet: 50 mg, 1 tab(s), Oral, bid, 180 tab(s), 3 Refill(s).       Problem list:    All Problems  ASCUS with positive high risk HPV cervical / SNOMED CT 0535854674 / Confirmed  Menarche / ICD-9-CM V21.8 / Confirmed  Obesity / SNOMED CT 9251068905 / Probable      Histories   Past Medical History:    Active  Menarche (V21.8): Onset in the month of 3/2007 at 11 years  Obesity (0645929778)  ASCUS with positive high risk HPV cervical ([id number])   Family History:    Squamous cell carcinoma  Grandfather (P) (General Family Hx)  Basal cell carcinoma  Grandfather (P) (General Family Hx)  Coronary artery disease  Grandfather (M) (General Family Hx)     Procedure history:    No active procedure history items have been selected or recorded.   Social History:        Alcohol Assessment: Current            Beer (12 oz), Wine (5 oz), 1-2 times per week, 3 drinks/episode average.  4 drinks/episode maximum.      Tobacco Assessment: Denies Tobacco Use            Never      Substance Abuse Assessment: Denies Substance Abuse            Never      Employment and Education Assessment            Employed.  Work/School description: CNA.  Activity level: Moderate physical work.      Home and Environment Assessment            Marital status: Single.  Lives with Family.  Injuries/Abuse/Neglect in household: No.  Feels unsafe at home:               No.  Family/Friends available for  support: Yes.      Nutrition and Health Assessment            Type of diet: Regular.  Wants to lose weight: Yes.  Sleeping concerns: Yes.      Exercise and Physical Activity Assessment: Occasional exercise            Exercise duration: 30.  Exercise frequency: 3-4 times/week.  Exercise type: Running, Walking.      Sexual Assessment            Sexually active: No.  Sexual orientation: Heterosexual.  Other sexual concerns: Birth control.            Identifies as female, History of STD: Yes.  Contraceptive Use Details: Birth control pill.  History of sexual               abuse: No.      Other Assessment            First menses age 11.  Regular menses, duration 3 days.  No history of abnormal Pap smear.        Physical Examination   General:  Alert and oriented, No acute distress.    Eye:  Normal conjunctiva.    Respiratory:  Respirations are non-labored.    Psychiatric:  Cooperative, Appropriate mood & affect, Normal judgment.       Impression and Plan   Diagnosis     Close exposure to 2019 novel coronavirus (HTG28-IX Z20.828).     Cough (XLI01-QN R05).     Fever (AIA67-VT R50.9).     Headache (HHK77-DG R51.9).     Patient Instructions:       Counseled: Patient.    Orders     Orders (Selected)   Outpatient Orders  Ordered (Dispatched)  SARS-CoV-2 RNA (COVID-19), Qualitative NAAT* ([facility]): Specimen Type: Anterior Nares, Collection Date: 11/02/20 16:26:00 CST.     explained that even if test is negative, she needs to assume this is COVID19 given close exposure (Living) with 2 COVID positive people. She is masking and trying to keep distanced.     Diagnosis     Patient is referred for [facility] COVID-19 testing and is instructed of the following:  Patient should remain isolated until results of test return and given that tests are not 100% accurate, would be safest to assume that they are contagious with COVID-19 until their symptoms have fully resolved. Isolation is recommended for at least 7 days from the onset of  symptoms and for 3 days after resolution of fevers and productive cough, unless test is positive, or exposure with COVID positive contact is confirmed, then isolation should be for 14 days. This means patient should not go to work or any public areas. In addition, it is recommended at home that they separate themselves from other people and from animals as much as possible, including using a separate bathroom. If they do need to be around others, a face mask is recommended. Frequent hand hygiene and cleaning of high touch surfaces is also recommended.   Symptoms can last for several weeks. For patients with COVID-19, they can sometimes start to improve and then get worse again. If symptoms worsen at any time, including significant shortness of breath, low oxygen levels, high fevers that cannot be controlled, or concerns for dehydration, they should seek medical care. If going to the ER, calling 911, or seeking care at the clinic, they are reminded to notify staff that they have been tested for COVID-19.  Patient also is informed that testing will be done in their car at a scheduled time.   Patient is also informed that testing for COVID-19 must be reported to the public health department along with contact information for the patient.   Patient information is given to scheduling staff to get patient scheduled for testing. Patient will receive further instructions from scheduling staff.  Patient is encouraged to call back at any time with questions or concerns.  .

## 2022-02-15 NOTE — PROGRESS NOTES
Patient:   KEVIN VILLA            MRN: [id number]            FIN: [id number]               Age:   21 years     Sex:  Female     :  1995   Associated Diagnoses:   Pneumonia   Author:   Nilesh Dodd PA-C      Visit Information      Date of Service: 2017 01:23 pm  Performing Location: [facility]  Encounter#: [id number]      Primary Care Provider (PCP):  Geovanni Tyler MD    NPI# [id number]   Visit type:  New symptom.    Source of history:  Self, Medical record.    History limitation:  None.       Chief Complaint   2017 1:25 PM CST    Starting last wednesday night-body aches and chills, friday sypmtoms went away, low grade fevers, developed congestion and dry cough, chest pain, green mucus in chest        History of Present Illness             The patient presents with cough.  The cough is described as productive green sputum.  The severity of the cough is moderate.  The cough is episodic, fluctuates in intensity and is worsening.  The cough has lasted for 1 week(s).  The context of the cough: occurred in association with illness.  Associated symptoms consist of chills, fever, nasal congestion, rhinorrhea and denies shortness of breath.  CC above noted and confirmed with the patient..        Review of Systems   Eye:  Negative.    Ear/Nose/Mouth/Throat:  Negative except as documented in history of present illness.    Respiratory:  Negative except as documented in history of present illness.       Health Status   Allergies:    Allergic Reactions (All)  No known allergies   Medications:  (Selected)   Prescriptions  Prescribed  Zithromax 250 mg oral tablet: 1 packet(s), PO, Once, Instructions: as directed on package labeling. Two tablets po on day one, then one tablet daily x four days, # 6 tab(s), 0 Refill(s), Type: Soft Stop, Pharmacy: [id number], 1 packet(s) po once,Instr:as directed on pac...  drospirenone-ethinyl estradiol 3 mg-0.02 mg oral tablet: 1  tab(s), po, daily, # 84 tab(s), 3 Refill(s), Pharmacy: [facility], patient will notify pharmacy when refill is needed, 1 tab(s) po daily   Problem list:    All Problems  Menarche / ICD-9-CM V21.8 / Confirmed  Obesity / SNOMED CT 6078879342 / Probable  Canceled: Chicken Pox / ICD-9-.9      Histories   Past Medical History:    Active  Menarche (V21.8): Onset in the month of 3/2007 at 11 years   Family History:    Squamous cell carcinoma  Grandfather (P) (General Family Hx)  Basal cell carcinoma  Grandfather (P) (General Family Hx)  Coronary artery disease  Grandfather (M) (General Family Hx)     Procedure history:    No active procedure history items have been selected or recorded.   Social History:        Alcohol Assessment                     Comments:                      01/28/2015 - Genesis Ash LPN                     Denies alcohol use.      Tobacco Assessment: Denies Tobacco Use            Never      Substance Abuse Assessment: Denies Substance Abuse            Never      Employment and Education Assessment            Student      Home and Environment Assessment            Lives with Roomate(s)/Friend(s).  Living situation: Home/Independent.      Nutrition and Health Assessment            Type of diet: Regular.      Exercise and Physical Activity Assessment: Occasional exercise            Exercise type: Walking.      Sexual Assessment            Sexually active: No.  Sexual orientation: Heterosexual.  Other sexual concerns: Birth control.      Other Assessment            First menses age 11.  Regular menses, duration 3 days.  No history of abnormal Pap smear.        Physical Examination   Vital Signs   1/30/2017 1:25 PM CST Temperature Tympanic 97.4 DegF  LOW    Peripheral Pulse Rate 80 bpm    Pulse Site Radial artery    HR Method Manual    Systolic Blood Pressure 116 mmHg    Diastolic Blood Pressure 82 mmHg    Mean Arterial Pressure 93 mmHg    BP Site Right arm    BP Method Manual       Measurements from flowsheet : Measurements   1/30/2017 1:25 PM CST    Weight Measured - Standard                195.2 lb     General:  Alert and oriented, No acute distress.    Eye:  Pupils are equal, round and reactive to light, Extraocular movements are intact, Normal conjunctiva.    HENT:  Normocephalic, Oral mucosa is moist, No pharyngeal erythema.         Nose: Both nostrils, Discharge ( Small amount, Green ).    Neck:  Supple, Non-tender, No lymphadenopathy.    Respiratory:  Respirations are non-labored, Breath sounds are equal.         Breath sounds: Rhonchi present.    Cardiovascular:  Normal rate, Regular rhythm, No murmur.    Psychiatric:  Cooperative, Appropriate mood & affect.       Impression and Plan   Diagnosis     Pneumonia (ENJ83-WS J18.9).     Patient Instructions:       Counseled: Patient, Regarding diagnosis, Regarding treatment, Regarding medications, Regarding activity, Verbalized understanding.    Orders     Orders (Selected)   Prescriptions  Prescribed  Zithromax 250 mg oral tablet: 1 packet(s), PO, Once, Instructions: as directed on package labeling. Two tablets po on day one, then one tablet daily x four days, # 6 tab(s), 0 Refill(s), Type: Soft Stop, Pharmacy: [facility] #[id number], 1 packet(s) po once,Instr:as directed on pac....     Take medicine as prescribed, side effects discussed.  Tylenol/ibuprofen for fever and discomfort.  Push fluids.  RTC if not improving in 36-48 hours, prior if concerns as we have discussed.

## 2022-02-15 NOTE — TELEPHONE ENCOUNTER
Entered by Steph Rubio CMA on May 23, 2019 8:50:40 AM CDT  ---------------------  From: Steph Rubio CMA   To: [facility]    Sent: 5/23/2019 8:50:40 AM CDT  Subject: Medication Management     ** Submitted: **  Order:ethinyl estradiol-norgestimate (Tri-Linyah 35 mcg oral tablet)  1 tab(s)  Oral  daily  Qty:  28 tab(s)        Refills:  0          Substitutions Allowed     [facility]    Signed by Steph Rubio CMA  5/23/2019 8:50:00 AM    ** Submitted: **  Complete:ethinyl estradiol-norgestimate ([facility]th[facility] Tri-Cyclen oral tablet)   Signed by Steph Rubio CMA  5/23/2019 8:50:00 AM    ** Not Approved:  **  ethinyl estradiol-norgestimate (norgestimate-ethinyl estradiol 0.18 mg/0.215mg/0.25mg-35 mcg(28)tablet)  Take 1 tablet by mouth once daily.  Qty:  84 tab(s)        Days Supply:  0        Refills:  4          Substitutions Allowed     Route To Pharmacy [facility]   Signed by Steph Rubio CMA            ------------------------------------------  From: [facility]  To: Geovanni Tyler MD  Sent: May 22, 2019 8:42:37 PM CDT  Subject: Medication Management  Due: May 23, 2019 8:42:37 PM CDT    ** On Hold Pending Signature **  Drug: ethinyl estradiol-norgestimate (Ortho Tri-Cyclen oral tablet)  1 tab(s) po daily  Quantity: 84 tab(s)     Days Supply: 0         Refills: 4  Substitutions Allowed  Notes from Pharmacy:     Dispensed Drug: ethinyl estradiol-norgestimate (Tri-Linyah 35 mcg oral tablet)  Take 1 tablet by mouth once daily.  Quantity: 84 tab(s)     Days Supply: 0         Refills: 4  Substitutions Allowed  Notes from Pharmacy:   ------------------------------------------  PCP:  SWATI    Medication:  Tri-Linyanadine  Last Filled:  5/14/18    Quantity: 84  Refills:  4    Date of last office visit and reason:  5/14/18 px  Date of last labs pertaining to condition:  n/a    Note:  Refill x 1 month supply.  Patient due for annual exam. Called: 8:51am-  to call and schedule PX.    Return to Clinic order placed?  yes

## 2022-02-15 NOTE — PROGRESS NOTES
Chief Complaint    Home sleep test 10/14/21  History of Present Illness       Patient is here to discuss her sleepiness.       Says she is always ready to sleep.  She goes to bed and gets 8 hours of sleep and wakes up she still sleepy and if she rested all during the day she will fall asleep.  She has not had any cataplexy symptoms.  She does note the is been present for the last year.  She thinks her mood has been good and she enjoys doing things.  She is not had any weight changes.  No tremors no headaches       Her home sleep test has good quality data good monitoring.  And was negative for any significant amount of apneas or hypopneas  Review of Systems       See HPI.  All other review of systems negative.  Physical Exam   Vitals & Measurements    HR: 88 (Peripheral)  BP: 122/72  SpO2: 99%     HT: 63.5 in        Alert and oriented normal cognition normal respirations supple neck no peripheral edema  Assessment/Plan       Idiopathic hypersomnia (G47.11)          I spent 25 minutes in discussion about her options and her symptoms most fit idiopathic hypersomnia her data on her home sleep test is quite good we could do a overnight in lab test with a daytime MSLT but I do not think it change her treatment at this point she would like to try some stimulant therapy and follow-up with me in about 3 weeks and see how she is doing         Ordered:          Basic Metabolic Panel* ([facility]), Specimen Type: Serum, Collection Date: 10/20/21 10:01:00 CDT          CBC (h/h, RBC, indices, WBC, Plt)* ([facility]), Specimen Type: Blood, Collection Date: 10/20/21 10:01:00 CDT          TSH* ([facility]), Specimen Type: Serum, Collection Date: 10/20/21 10:01:00 CDT           Patient Information     Name:KEVIN VILLA      Address:      [address] [id number]     Sex:Female     YOB: 1995     [phone]     Emergency Contact:SOFÍA VILLA     MRN:[id number]     FIN:[id number]     Location:  [facility]     Date of Service:10/20/2021      Primary Care Physician:       Nayeli ROSEN, Geovanni, [phone]      Attending Physician:       Kirk Ybarra MD, [phone]  Problem List/Past Medical History    Ongoing     ASCUS with positive high risk HPV cervical     Menarche     Obesity    Historical     No qualifying data  Medications    MulitVitamin    Ortho Tri-Cyclen oral tablet, 1 tab(s), Oral, daily, 4 refills  Allergies    No Known Medication Allergies  Social History    Smoking Status     Never smoker     Alcohol - Current      Beer (12 oz), Wine (5 oz), 1-2 times per week, 3 drinks/episode average. 4 drinks/episode maximum.     Electronic Cigarette/Vaping      Electronic Cigarette Use: Never.     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     Exercise - Occasional exercise      Exercise duration: 30. Exercise frequency: 3-4 times/week. Exercise type: Running, Walking.     Home/Environment      Marital status: Single. Lives with Family. Injuries/Abuse/Neglect in household: No. Feels unsafe at home: No. Family/Friends available for support: Yes.     Nutrition/Health      Type of diet: Regular. Wants to lose weight: Yes. Sleeping concerns: Yes.     Other      First menses age 11. Regular menses, duration 3 days. No history of abnormal Pap smear.     Sexual      Identifies as female, History of STD: Yes. Contraceptive Use Details: Birth control pill. History of sexual abuse: No.     Substance Abuse - Denies Substance Abuse      Never     Tobacco - Denies Tobacco Use      Never (less than 100 in lifetime)  Family History    Basal cell carcinoma: Grandfather (P).    Coronary artery disease: Grandfather (M).    Squamous cell carcinoma: Grandfather (P).  Lab Results          Lab Results (Last 4 results within 90 days)           Sodium Level: 137 mmol/L [135 mmol/L - 146 mmol/L] (10/20/21 10:06:00)          Sodium Level: 139 mmol/L [135  mmol/L - 146 mmol/L] (09/13/21 13:13:00)          Potassium Level: 4.9 mmol/L [3.5 mmol/L - 5.3 mmol/L] (10/20/21 10:06:00)          Potassium Level: 4.8 mmol/L [3.5 mmol/L - 5.3 mmol/L] (09/30/21 12:23:00)          Potassium Level: 5.4 mmol/L High [3.5 mmol/L - 5.3 mmol/L] (09/13/21 13:13:00)          Chloride Level: 101 mmol/L [98 mmol/L - 110 mmol/L] (10/20/21 10:06:00)          Chloride Level: 102 mmol/L [98 mmol/L - 110 mmol/L] (09/13/21 13:13:00)          CO2 Level: 29 mmol/L [20 mmol/L - 32 mmol/L] (10/20/21 10:06:00)          CO2 Level: 27 mmol/L [20 mmol/L - 32 mmol/L] (09/13/21 13:13:00)          Glucose Level: 89 mg/dL [65 mg/dL - 99 mg/dL] (10/20/21 10:06:00)          Glucose Level: 95 mg/dL [65 mg/dL - 99 mg/dL] (09/13/21 13:13:00)          BUN: 10 mg/dL [7 mg/dL - 25 mg/dL] (10/20/21 10:06:00)          BUN: 8 mg/dL [7 mg/dL - 25 mg/dL] (09/13/21 13:13:00)          Creatinine Level: 0.73 mg/dL [0.5 mg/dL - 1.1 mg/dL] (10/20/21 10:06:00)          Creatinine Level: 0.64 mg/dL [0.5 mg/dL - 1.1 mg/dL] (09/13/21 13:13:00)          BUN/Creat Ratio: NOT APPLICABLE [6  - 22] (10/20/21 10:06:00)          BUN/Creat Ratio: NOT APPLICABLE [6  - 22] (09/13/21 13:13:00)          eGFR: 114 mL/min/1.73m2 (10/20/21 10:06:00)          eGFR: 123 mL/min/1.73m2 (09/13/21 13:13:00)          eGFR : 132 mL/min/1.73m2 (10/20/21 10:06:00)          eGFR African American: 143 mL/min/1.73m2 (09/13/21 13:13:00)          Calcium Level: 9.2 mg/dL [8.6 mg/dL - 10.2 mg/dL] (10/20/21 10:06:00)          Calcium Level: 10 mg/dL [8.6 mg/dL - 10.2 mg/dL] (09/13/21 13:13:00)          TSH: 1.46 mIU/L (10/20/21 10:06:00)          TSH: 1.26 mIU/L (09/13/21 13:13:00)          WBC: 9.6 [3.8  - 10.8] (10/20/21 10:06:00)          WBC: 9.1 [3.8  - 10.8] (09/13/21 13:13:00)          RBC: 4.58 [3.8  - 5.1] (10/20/21 10:06:00)          RBC: 4.9 [3.8  - 5.1] (09/13/21 13:13:00)          Hgb: 13.6 gm/dL [11.7 gm/dL - 15.5 gm/dL] (10/20/21  10:06:00)          Hgb: 14 gm/dL [11.7 gm/dL - 15.5 gm/dL] (09/13/21 13:13:00)          Hct: 41 % [35 % - 45 %] (10/20/21 10:06:00)          Hct: 42.8 % [35 % - 45 %] (09/13/21 13:13:00)          MCV: 89.5 fL [80 fL - 100 fL] (10/20/21 10:06:00)          MCV: 87.3 fL [80 fL - 100 fL] (09/13/21 13:13:00)          MCH: 29.7 pg [27 pg - 33 pg] (10/20/21 10:06:00)          MCH: 28.6 pg [27 pg - 33 pg] (09/13/21 13:13:00)          MCHC: 33.2 gm/dL [32 gm/dL - 36 gm/dL] (10/20/21 10:06:00)          MCHC: 32.7 gm/dL [32 gm/dL - 36 gm/dL] (09/13/21 13:13:00)          RDW: 12.7 % [11 % - 15 %] (10/20/21 10:06:00)          RDW: 13.1 % [11 % - 15 %] (09/13/21 13:13:00)          Platelet: 274 [140  - 400] (10/20/21 10:06:00)          Platelet: 372 [140  - 400] (09/13/21 13:13:00)          MPV: 11.3 fL [7.5 fL - 12.5 fL] (10/20/21 10:06:00)          MPV: 10.9 fL [7.5 fL - 12.5 fL] (09/13/21 13:13:00)  Immunizations          Scheduled Immunizations          Dose Date(s)          human papillomavirus vaccine          02/03/2011, 10/26/2011          influenza virus vaccine, inactivated          10/21/2016, 01/14/2019, 10/25/2019          meningococcal conjugate vaccine          08/27/2007          tetanus/diphth/pertuss (Tdap) adult/adol          08/27/2007          varicella          10/11/2012, 04/10/2000          Other Immunizations          influenza virus vaccine, inactivated          10/24/2017, 01/10/2019, 10/16/2020          Td          10/24/2017          tetanus/diphth/pertuss (Tdap) adult/adol          10/24/2017          human papillomavirus vaccine          08/06/2009          SARS-CoV-2 (COVID-19) Pfizer-162b2          01/05/2021, 01/26/2021

## 2022-02-15 NOTE — TELEPHONE ENCOUNTER
---------------------  From: Geovanni Tyler MD   To: Geovanni Tyler MD; CHERIE VILLA    Sent: 10/20/2021 7:02:07 AM CDT  Subject: ultrasound results     Cherie,    I got your ultrasound results back and wanted to summarize them here.  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 This means that there are two different portions to the uterus. The uterus was otherwise normal with no fibroids or polyps evident.  There is a 3 cm cyst in your right ovary. It is recommended that we repeat the ultrasound in 2-3 months to make sure it resolves.   The left ovary is normal.    Let me know what is a good day and time for me to call and we can go through the results and answer any questions.    Felecia Shaw sleep test was normal. No evidence of sleep apnea.  Sorry I missed you by phone.  Please respond with questions.  Felecia Tyler---------------------  From: Geovanni Tyler MD   To: CHERIE VILLA    Sent: 10/21/2021 4:17:12 PM CDT  Subject: FW: ultrasound resultsAttempted to reach patient again - left VM.spoke with patient at 245pm, reviewed uterine findings and implications  will repeat ultrasound in 2 months as recommended.

## 2022-02-15 NOTE — LETTER
(Inserted Image. Unable to display)   [address] [place] [address]  July 16, 2020      KEVIN VILLA      [address] [place] [phone]      Dear KEVIN,    Thank you for selecting [facility] for your healthcare needs. Below you will find the results of the recent tests done at our clinic.     Your lab results are normal. We should repeat this in 3 months if you find the spironolactone to be useful and plan to continue it.    Result Name Current Result Reference Range   Sodium Level (mmol/L)  139 7/15/2020 135 - 146   Potassium Level (mmol/L)  4.2 7/15/2020 3.5 - 5.3   Chloride Level (mmol/L)  104 7/15/2020 98 - 110   CO2 Level (mmol/L)  29 7/15/2020 20 - 32   Glucose Level (mg/dL)  83 7/15/2020 65 - 139   BUN (mg/dL)  7 7/15/2020 7 - 25   Creatinine Level (mg/dL)  0.69 7/15/2020 0.50 - 1.10   eGFR (mL/min/1.73m2)  121 7/15/2020 > OR = 60 -    Calcium Level (mg/dL)  9.0 7/15/2020 8.6 - 10.2       Please contact me or my assistant at [phone] if you have any questions or concerns.     Sincerely,        Geovanni Tyler M.D.

## 2022-02-15 NOTE — LETTER
(Inserted Image. Unable to display)   July 19, 2021      [place] [address]        Dear KEVIN,      Thank you for selecting [facility] for your healthcare needs.     Our records indicate you are due for the following services:     Annual Physical    (FYI   Regarding office visits: In some instances, a video visit or telephone visit may be offered as an option.)      To schedule an appointment or if you have further questions, please contact your clinic at [phone].      Powered by [employer]    Sincerely,    Geovanni Tyler M.D.

## 2022-02-15 NOTE — TELEPHONE ENCOUNTER
---------------------  From: Geovanni Tyler MD   To: CHERIE VILLA    Sent: 7/16/2020 5:29:53 PM CDT  Subject: Patient Message - Results Notification     Cherie,  Labs were all normal. Normal pap smear.  This can be repeated every three years given two normals in a row. We will see you in one year for an annual exam. Let me know if you have questions.  Felecia Tyler    Results:  Date Result Name Value Ref Range   7/15/2020 11:58 AM Chlam/N. gonorrhea Comments See comment    7/15/2020 11:58 AM Chlamydia RNA NOT DETECTED (NOT DETECTED - )   7/15/2020 11:58 AM Neisseria gonorrhoeae RNA NOT DETECTED (NOT DETECTED - )

## 2022-02-15 NOTE — TELEPHONE ENCOUNTER
---------------------  From: Robina Herbert   To: Tate ROSEN, Kirk;     Sent: 10/18/2021 8:32:40 AM CDT  Subject: HST     Patient has a home sleep study uploaded into [employer] ready for interpretation

## 2022-02-15 NOTE — TELEPHONE ENCOUNTER
---------------------  From: Sindi Rdoriguez RN (Phone Messages Pool ([id number]_[place]   To: [employer] Message Pool ([id number]);     Sent: 11/2/2020 11:26:20 AM CST  Subject: COVID symptoms/exposure     Phone message    PCP:   SWATI      Time of Call:  1102       Person Calling:  Pt mother  Phone number:  [phone], [place] to [facility]    Returned call at: _    Note:   Pt and family had COVID testing done last week through father's work. Father and sibling have COVID + in home. Pt and mother were COVID -.    Pt woke with COVID symptoms this morning. She is wondering if CARLOS wants her to be tested or just assume its COVID.    Please advise.---------------------  From: Amalia Gar CMA ([facility] Message Pool ([id number]_[facility]))   To: Geovanni Tyler MD;     Sent: 11/2/2020 12:09:53 PM CST  Subject: FW: COVID symptoms/exposureI recommend testing to confirm.---------------------  From: Geovanni Tyler MD   To: Tap2[employer] Message Pool ([id number]);     Sent: 11/2/2020 12:20:44 PM CST  Subject: RE: COVID symptoms/exposurePt mom notified @ 12:54pm to schedule an appt-no medical information given. She was transferred.

## 2022-02-15 NOTE — PROGRESS NOTES
Patient:   CHERIE RODRÍGUEZ            MRN: [id number]            FIN: [id number]               Age:   25 years     Sex:  Female     :  1995   Associated Diagnoses:   Well adult exam; Screening for diabetes mellitus; Dysmenorrhea; Fatigue; ASCUS with positive high risk HPV cervical   Author:   Geovanni Tyler MD      Visit Information      Date of Service: 2021 11:54 am  Performing Location: [facility]  Encounter#: [id number]      Primary Care Provider (PCP):  Geovanni Tyler MD    NPI# [id number]      Referring Provider:  Geovanni Tyler MD, NPI# [id number]      Chief Complaint   2021 12:18 PM CDT   Patient presents for annual physical exam. Fatigue and snoring x 3-6 months concerns MARY family history.      History of Present Illness   Cherie Rodríguez is a 25 year old female who presents for her annual physical. She reports that her health has been good in the past year. She has had some issues with tiredness however and wished to discuss the possibility of MARY. She notes that her sleep does not feel restful, whether she sleeps 5, 8, or 10 hours.  She notes that she knows being overweight is a risk factor. She began to think about this after friends on a camping trip commented that she snored loudly and frequently. She denies major signs of daytime sleepiness such as falling asleep during tasks or while driving. She denies any headaches on awakening as well as dry mouth.   Cherie also endorses markedly painful periods. She has heavy abdominal cramping from her periods, to the point she almost considered leaving work early from the discomfort. The period pains are largely responsive to ibuprofen. She also notes lower back pain can be brought on by her period. Her periods are not heavy however, using only 2-3 pads per day while on her period. She recalls 2 episodes about 1.5 years ago where similar cramping pain occured outside of her periods, but none have occurred since  then.       Review of Systems   Constitutional:  Fatigue.    Eye:  Negative.    Ear/Nose/Mouth/Throat:  Negative.    Respiratory:  Negative.    Cardiovascular:  Negative.    Gastrointestinal:  Negative.    Genitourinary:  Negative.    Gynecologic:  Dysmenorrhea.    Hematology/Lymphatics:  Negative.    Endocrine:  Negative.    Immunologic:  Negative.    Musculoskeletal:       Back pain: In the lower region, The pain is mild.    Integumentary:  Negative.    Neurologic:  Headache.    Psychiatric:  Negative.    All other systems reviewed and negative      Health Status   Allergies:    Allergic Reactions (Selected)  No Known Medication Allergies   Medications:  (Selected)   Prescriptions  Prescribed  Ortho Tri-Cyclen oral tablet: 1 tab(s), PO, Daily, # 84 tab(s), 4 Refill(s), Type: Maintenance, Pharmacy: [facility], 1 tab(s) Oral daily, 63.5, in, 09/13/21 12:18:00 CDT, Height Measured, 187.4, lb, 09/13/21 12:18:00 CDT...  Documented Medications  Documented  MulitVitamin: MulitVitamin, 0 Refill(s), Type: Maintenance,    Medications          *denotes recorded medication          *MulitVitamin: 0 Refill(s).          Ortho Tri-Cyclen oral tablet: 1 tab(s), PO, Daily, 84 tab(s), 4 Refill(s).       Problem list:    All Problems  ASCUS with positive high risk HPV cervical / SNOMED CT 0621415466 / Confirmed  Menarche / ICD-9-CM V21.8 / Confirmed  Obesity / SNOMED CT 6241815732 / Probable      Histories   Past Medical History:    Active  Menarche (V21.8): Onset in the month of 3/2007 at 11 years  Obesity (6176680117)  ASCUS with positive high risk HPV cervical (8231772791)   Family History:    Squamous cell carcinoma  Grandfather (P) (General Family Hx)  Basal cell carcinoma  Grandfather (P) (General Family Hx)  Coronary artery disease  Grandfather (M) (General Family Hx)     Procedure history:    No active procedure history items have been selected or recorded.   Social  History:        Electronic Cigarette/Vaping Assessment            Electronic Cigarette Use: Never.      Alcohol Assessment: Current            Beer (12 oz), Wine (5 oz), 1-2 times per week, 3 drinks/episode average.  4 drinks/episode maximum.      Tobacco Assessment: Denies Tobacco Use            Never            Never (less than 100 in lifetime)      Substance Abuse Assessment: Denies Substance Abuse            Never      Employment and Education Assessment            Employed.  Work/School description: CNA.  Activity level: Moderate physical work.      Home and Environment Assessment            Marital status: Single.  Lives with Family.  Injuries/Abuse/Neglect in household: No.  Feels unsafe at home:               No.  Family/Friends available for support: Yes.      Nutrition and Health Assessment            Type of diet: Regular.  Wants to lose weight: Yes.  Sleeping concerns: Yes.      Exercise and Physical Activity Assessment: Occasional exercise            Exercise duration: 30.  Exercise frequency: 3-4 times/week.  Exercise type: Running, Walking.      Sexual Assessment            Sexually active: No.  Sexual orientation: Heterosexual.  Other sexual concerns: Birth control.            Identifies as female, History of STD: Yes.  Contraceptive Use Details: Birth control pill.  History of sexual               abuse: No.      Other Assessment            First menses age 11.  Regular menses, duration 3 days.  No history of abnormal Pap smear.        Physical Examination   Last Menstrual Period: 9/7/2021   Vital Signs   9/13/2021 12:18 PM CDT Temperature Tympanic 98.0 DegF    Peripheral Pulse Rate 97 bpm    HR Method Electronic    Systolic Blood Pressure 130 mmHg    Diastolic Blood Pressure 68 mmHg    Mean Arterial Pressure 89 mmHg    BP Site Right arm    BP Method Manual    Oxygen Saturation 99 %      Measurements from flowsheet : Measurements   9/13/2021 12:18 PM CDT Height Measured - Standard 63.5 in    Weight  Measured - Standard 187.4 lb    BSA 1.95 m2    Body Mass Index 32.67 kg/m2  HI      General: pleasant female who appears her stated age and is in no apparent distress  HEENT: TM clear bilaterally, nasal mucosa normal, oral mucosa moist, no pharyngeal erythema,   Neck: no cervical or supraclavicular LAD. Thyroid of normal size w/o nodules  Cardiac: normal rate, regular rhythm w/o rubs, murmurs, or gallops  Pulmonary: CTAB  Abdominal: BS+, non-tender to palpation in all 4 quadrants  Neurologic: CN II-XI intact, 2+ reflexes throughout, muscle strength 5/5 in bilateral upper and lower extremities.   MSK: normal joints, normal ROM  psych: normal mood and affect  skin: no concerning lesions or rash      Impression and Plan   Diagnosis     Well adult exam (CXE61-PG Z00.00).     Screening for diabetes mellitus (WLH06-GG Z13.1).     Course:  progressing as expected, continue healthy diet, exercise, sleep. Pap due next year..    Orders     labs as ordered.     Diagnosis     Dysmenorrhea (HKK20-SX N94.6).     Fatigue (WMX91-JI R53.83).     Course:  Progressing as expected.    Plan:    #Dysmenorrhea  Unclear etiology of cramping and pains. differential diagnosis include endometriosis, ovarian cysts, or may just be painful periods; but all of these would likely improve with TONY, so starting birth control would be first line. Patient will reach out if recurrence of pain.   - restart Ortho Tri-Cyclen Birth Control  - monitor for abdominal pain unassociated with period. Will consider pelvic US at that time    #Fatigue  Unlikely to be MARY as constellation of symptoms are not all present, but notes family history of sleep apnea and heavy snoring with restless sleep and fatigue. A sleep study will tell definitively. Consider anemia or hypothyroid or other metabolic condition on differential, although less likely as no symptoms such as weight gain or pallor to point to these etiologies.   - arrange at home sleep study  - order CBC,  BMP, TSH levels     #Preventative Cares  - reminder of PAP smear in 1 year.    Patient Instructions:       Counseled: Patient.    Orders     Orders (Selected)   Outpatient Orders  Ordered  Return to Clinic (Request): RFV: Annual px, Return in 1 year  Return to Clinic (Request): RFV: Pap smear, Return in 7/2023  Ordered (In Transit)  Basic Metabolic Panel* ([facility]): Specimen Type: Serum, Collection Date: 09/13/21 13:08:00 CDT  CBC (h/h, RBC, indices, WBC, Plt)* ([facility]): Specimen Type: Blood, Collection Date: 09/13/21 13:08:00 CDT  TSH* ([facility]): Specimen Type: Serum, Collection Date: 09/13/21 13:08:00 CDT  Prescriptions  Prescribed  Ortho Tri-Cyclen oral tablet: 1 tab(s), PO, Daily, # 84 tab(s), 4 Refill(s), Type: Maintenance, Pharmacy: [facility], 1 tab(s) Oral daily, 63.5, in, 09/13/21 12:18:00 CDT, Height Measured, 187.4, lb, 09/13/21 12:18:00 CDT....          Patient was seen with student Kirk Pool MS4 and history and exam confirmed personally by me. Agree that documentation reflects findings and plan. I completed medical decision making after discussion with student and with patient.  Geovanni Tyler MD

## 2022-02-15 NOTE — TELEPHONE ENCOUNTER
---------------------  From: Jessica Alfaro RN (Phone Messages Pool ([id number]_[place]))   To: [employer] Message Pool ([id number]);     Sent: 10/11/2021 11:46:40 AM CDT  Subject: General Message       PCP:    SWATI    Time of Call:  10:51am       Person Calling:  pt  Phone number:  [phone]    Note:  VM received from pt, stating she was seen by KW and was told to call if having intense cramping with period, which she is having. Pt requested an order for US to rule out endometriosis.     Please advise.    Last office visit and reason:  9/13/21 wellness visit SWATI---------------------  From: Gloria Gomez LPN ([employer] Message Pool ([id number]))   To: Geovanni Tyler MD;     Sent: 10/11/2021 11:52:38 AM CDT  Subject: FW: General Message  ** Submitted: **  Order:US Pelvic Non-OB (Request)  Details:  Chronic pelvic pain in female  Dysmenorrhea         Signed by Geovanni Tyler MD  10/11/2021 5:42:00 PM [facility]        Please let her know that an ultrasound will not diagnose endometriosis. It gives us an idea if there are other causes of the pain. Reasonable to do an ultrasound given her ongoing cramping. Let me know if she has questions.---------------------  From: Geovanni Tyler MD   To: ViratechL Message Pool ([id number]_[facility]);     Sent: 10/11/2021 12:43:07 PM CDT  Subject: RE: General [id number] Spoke with patient and she would like to move forward with an ultrasound. Informed her that someone will reach out to her to help her get that scheduled.

## 2022-02-15 NOTE — TELEPHONE ENCOUNTER
---------------------  From: Dimitry Shukla CMA ([facility] Message Pool ([id number]_[place]   To: Appointment Pool ([id number]_[place]);     Sent: 6/29/2021 2:19:06 PM CDT !  Subject: Curbside testing     Please add pt to curbside today for COVID testing per [facility].  Thank you Dimitry Montanez scheduled

## 2022-02-15 NOTE — NURSING NOTE
Comprehensive Intake Entered On:  9/13/2021 12:25 PM CDT    Performed On:  9/13/2021 12:18 PM CDT by Brandi Valdez CMA               Summary   Chief Complaint :   Patient presents for annual physical exam. Fatigue and snoring x 3-6 months concerns MARY family history.   Last Menstrual Period :   9/7/2021 CDT   Menstrual Status :   Menarcheal   Weight Measured :   187.4 lb(Converted to: 187 lb 6 oz, 85.003 kg)    Height Measured :   63.5 in(Converted to: 5 ft 3 in, 161.29 cm)    Body Mass Index :   32.67 kg/m2 (HI)    Body Surface Area :   1.95 m2   Systolic Blood Pressure :   130 mmHg   Diastolic Blood Pressure :   68 mmHg   Mean Arterial Pressure :   89 mmHg   Peripheral Pulse Rate :   97 bpm   BP Site :   Right arm   BP Method :   Manual   HR Method :   Electronic   Temperature Tympanic :   98.0 DegF(Converted to: 36.7 DegC)    Oxygen Saturation :   99 %   Brandi Valdez CMA - 9/13/2021 12:18 PM CDT   Health Status   Allergies Verified? :   Yes   Medication History Verified? :   Yes   Immunizations Current :   Yes   Tobacco Use? :   Never smoker   Brandi Valdez CMA - 9/13/2021 12:18 PM CDT   Consents   Consent for Immunization Exchange :   Consent Granted   Consent for Immunizations to Providers :   Consent Granted   Brandi Valdez CMA - 9/13/2021 12:18 PM CDT   Meds / Allergies   (As Of: 9/13/2021 12:25:30 PM CDT)   Allergies (Active)   No Known Medication Allergies  Estimated Onset Date:   Unspecified ; Created By:   Genesis Ash LPN; Reaction Status:   Active ; Category:   Drug ; Substance:   No Known Medication Allergies ; Type:   Allergy ; Updated By:   Genesis Ash LPN; Reviewed Date:   9/13/2021 12:19 PM CDT        Medication List   (As Of: 9/13/2021 12:25:30 PM CDT)   Home Meds    Miscellaneous Prescription  :   Miscellaneous Prescription ; Status:   Documented ; Ordered As Mnemonic:   MulitVitamin ; Simple Display Line:   0 Refill(s) ; Catalog Code:   Miscellaneous Prescription ; Order  Dt/Tm:   7/15/2020 11:09:06 AM CDT

## 2022-02-15 NOTE — NURSING NOTE
Comprehensive Intake Entered On:  6/29/2021 1:32 PM CDT    Performed On:  6/29/2021 1:27 PM CDT by Dimitry Shukla CMA               Summary   Chief Complaint :   Verbal consent given for a video visit.  Pt is c/o body aches,chills,headache,fatigue and lightheadedness x 5 days   Menstrual Status :   Menarcheal   Dimitry Shukla CMA - 6/29/2021 1:27 PM CDT   Health Status   Allergies Verified? :   Yes   Medication History Verified? :   Yes   Immunizations Current :   Yes   Medical History Verified? :   Yes   Pre-Visit Planning Status :   Not completed   Tobacco Use? :   Never smoker   Dimitry Shukla CMA - 6/29/2021 1:27 PM CDT   Meds / Allergies   (As Of: 6/29/2021 1:32:58 PM CDT)   Allergies (Active)   No Known Medication Allergies  Estimated Onset Date:   Unspecified ; Created By:   Genesis Ash LPN; Reaction Status:   Active ; Category:   Drug ; Substance:   No Known Medication Allergies ; Type:   Allergy ; Updated By:   Genesis Ash LPN; Reviewed Date:   6/29/2021 1:31 PM CDT        Medication List   (As Of: 6/29/2021 1:32:58 PM CDT)   Prescription/Discharge Order    spironolactone  :   spironolactone ; Status:   Processing ; Ordered As Mnemonic:   spironolactone 50 mg oral tablet ; Ordering Provider:   Geovanni Tyler MD; Action Display:   Complete ; Catalog Code:   spironolactone ; Order Dt/Tm:   6/29/2021 1:31:15 PM CDT            Home Meds    ISOtretinoin  :   ISOtretinoin ; Status:   Processing ; Ordered As Mnemonic:   ISOtretinoin 40 mg oral capsule ; Action Display:   Complete ; Catalog Code:   ISOtretinoin ; Order Dt/Tm:   6/29/2021 1:31:06 PM CDT          ethinyl estradiol-norgestimate  :   ethinyl estradiol-norgestimate ; Status:   Processing ; Ordered As Mnemonic:   Tri-Linyah ; Action Display:   Complete ; Catalog Code:   ethinyl estradiol-norgestimate ; Order Dt/Tm:   6/29/2021 1:31:09 PM CDT          Miscellaneous Prescription  :   Miscellaneous Prescription ; Status:   Documented ; Ordered As  Mnemonic:   MulitVitamin ; Simple Display Line:   0 Refill(s) ; Catalog Code:   Miscellaneous Prescription ; Order Dt/Tm:   7/15/2020 11:09:06 AM CDT            Social History   Social History   (As Of: 6/29/2021 1:32:58 PM CDT)   Alcohol:  Current      Beer (12 oz), Wine (5 oz), 1-2 times per week, 3 drinks/episode average.  4 drinks/episode maximum.   (Last Updated: 6/6/2019 11:06:51 AM CDT by Nessa Gutierres)          Tobacco:  Denies Tobacco Use      Never   (Last Updated: 1/28/2014 1:19:55 PM CST by Nessa Gutierres)   Never (less than 100 in lifetime)   (Last Updated: 6/29/2021 1:29:53 PM CDT by Dimitry Shukla CMA)          Electronic Cigarette/Vaping:        Electronic Cigarette Use: Never.   (Last Updated: 6/29/2021 1:29:56 PM CDT by Dimitry Shukla CMA)          Substance Abuse:  Denies Substance Abuse      Never   (Last Updated: 10/4/2013 8:58:59 AM CDT by Genesis Ash LPN)          Employment/School:        Employed.  Work/School description: CNA.  Activity level: Moderate physical work.   (Last Updated: 7/15/2020 11:05:07 AM CDT by Negra Rangel CMA)          Home/Environment:        Marital status: Single.  Lives with Family.  Injuries/Abuse/Neglect in household: No.  Feels unsafe at home: No.  Family/Friends available for support: Yes.   (Last Updated: 7/15/2020 11:05:08 AM CDT by Negra Rangel CMA)          Nutrition/Health:        Type of diet: Regular.  Wants to lose weight: Yes.  Sleeping concerns: Yes.   (Last Updated: 7/15/2020 11:05:11 AM CDT by Negra Rangel CMA)          Exercise:  Occasional exercise      Exercise duration: 30.  Exercise frequency: 3-4 times/week.  Exercise type: Running, Walking.   (Last Updated: 7/15/2020 11:05:08 AM CDT by Negra Rangel CMA)          Sexual:        Sexually active: No.  Sexual orientation: Heterosexual.  Other sexual concerns: Birth control.   (Last Updated: 1/28/2014 1:20:16 PM CST by Nessa Gutierres)   Identifies as female, History of STD: Yes.  Contraceptive  Use Details: Birth control pill.  History of sexual abuse: No.   (Last Updated: 6/6/2019 11:09:22 AM CDT by Nessa Gutierres)          Other:        First menses age 11.  Regular menses, duration 3 days.  No history of abnormal Pap smear.   (Last Updated: 1/28/2014 1:19:43 PM CST by Nessa Gutierres)

## 2022-02-15 NOTE — TELEPHONE ENCOUNTER
---------------------  From: Geovanni Tyler MD   To: CHERIE VILLA    Sent: 10/1/2021 10:52:38 AM CDT  Subject: lab results     Cherie,  Your potassium level is normal. Let me know if you have questions. I'll send a copy through the mail.  Felecia Tyler      Results:  Date Result Name Value Ref Range   9/30/2021 12:23 PM Potassium Level 4.8 mmol/L (3.5 - 5.3)

## 2022-02-15 NOTE — PROCEDURES
Accession Number:       [id number]  CLINICAL INFORMATION::     High risk HPV Hx/Rx  LMP::     [id number]  PREV. PAP::     4/3/17 HPV+  PREV. BX::     NONE GIVEN  SOURCE::     Cervix, Endocervix  STATEMENT OF ADEQUACY::     Satisfactory for evaluation. Endocervical/transformation zone component absent.  INTERPRETATION/RESULT::     Negative for intraepithelial lesion or malignancy.  COMMENT::     This Pap test has been evaluated with computer assisted technology.  CYTOTECHNOLOGIST::     MUMTAZ KEMP(ASCP) CT Screening location: [place] [address]  REVIEW CYTOTECHNOLOGIST::     MUMTAZ BRUSH(ASCP) CT Screening location: [place] [address]  COMMENT:     See comment       EXPLANATORY NOTE:         The Pap is a screening test for cervical cancer. It is       not a diagnostic test and is subject to false negative       and false positive results. It is most reliable when a       satisfactory sample, regularly obtained, is submitted       with relevant clinical findings and history, and when       the Pap result is evaluated along with historic and       current clinical information.

## 2022-02-15 NOTE — PROGRESS NOTES
Patient:   KEVIN VILLA            MRN: [id number]            FIN: [id number]               Age:   24 years     Sex:  Female     :  1995   Associated Diagnoses:   Screening for STD (sexually transmitted disease); Encounter for screening for cervical cancer; Well adult exam; Cystic acne vulgaris   Author:   Geovanni Tyler MD      Visit Information   Visit type:  Annual exam.    Source of history:  Self.    History limitation:  None.       Chief Complaint   7/15/2020 11:05 AM CDT   Px and PAP, not taking Birth Control pill would like to discuss other options      Well Adult History   Well Adult History             The patient presents for well adult exam.  The patient's general health status is described as good.  The patient's diet is described as balanced.  Exercise: routine.  Associated symptoms consist of none.  Last menstrual period: regular.  Medical encounters: none.  Additional pertinent history: prior hx of ASCUS pap, most recent pap was normal.     Patient not currently sexually active, stopped ocp, would like to discuss alternatives for cystic acne, has some closed comedones but the problematic ones are deep, firm, painful, red, worst on chin      Review of Systems   ROS reviewed as documented in chart      Health Status   Allergies:    Allergic Reactions (Selected)  No Known Medication Allergies   Medications:  (Selected)   Prescriptions  Prescribed  Tri-Linyah 35 mcg oral tablet: 1 tab(s), Oral, daily, # 84 tab(s), 4 Refill(s), Type: Maintenance, [id number], 1 tab(s) Oral daily   Problem list:    All Problems  Obesity / SNOMED CT 8025438109 / Probable  Menarche / ICD-9-CM V21.8 / Confirmed  ASCUS with positive high risk HPV cervical / SNOMED CT 6719492233 / Confirmed  Canceled: Chicken Pox / ICD-9-.9      Histories   Past Medical History:    Active  Menarche (ICD-9-CM V21.8): Onset in the month of 3/2007 at 11 years  Obesity (SNOMED CT  4704753207)  ASCUS with positive high risk HPV cervical (SNOMED CT 6383380224)   Family History:    Squamous cell carcinoma  Grandfather (P) (General Family Hx)  Basal cell carcinoma  Grandfather (P) (General Family Hx)  Coronary artery disease  Grandfather (M) (General Family Hx)     Procedure history:    No active procedure history items have been selected or recorded.   Social History:        Alcohol Assessment: Current            Beer (12 oz), Wine (5 oz), 1-2 times per week, 3 drinks/episode average.  4 drinks/episode maximum.      Tobacco Assessment: Denies Tobacco Use            Never      Substance Abuse Assessment: Denies Substance Abuse            Never      Employment and Education Assessment            Employed.  Work/School description: CNA.  Activity level: Moderate physical work.      Home and Environment Assessment            Marital status: Single.  Lives with Family.  Injuries/Abuse/Neglect in household: No.  Feels unsafe at home:               No.  Family/Friends available for support: Yes.      Nutrition and Health Assessment            Type of diet: Regular.  Wants to lose weight: Yes.  Sleeping concerns: Yes.      Exercise and Physical Activity Assessment: Occasional exercise            Exercise duration: 30.  Exercise frequency: 3-4 times/week.  Exercise type: Running, Walking.      Sexual Assessment            Sexually active: No.  Sexual orientation: Heterosexual.  Other sexual concerns: Birth control.            Identifies as female, History of STD: Yes.  Contraceptive Use Details: Birth control pill.  History of sexual               abuse: No.      Other Assessment            First menses age 11.  Regular menses, duration 3 days.  No history of abnormal Pap smear.        Physical Examination   Last Menstrual Period: 6/20/2020   Vital Signs   7/15/2020 11:05 AM CDT Peripheral Pulse Rate 66 bpm    HR Method Manual    Systolic Blood Pressure 100 mmHg    Diastolic Blood Pressure 70 mmHg     Mean Arterial Pressure 80 mmHg    BP Method Manual      Measurements from flowsheet : Measurements   7/15/2020 11:05 AM CDT Height Measured - Standard 64 in    Weight Measured - Standard 191.0 lb    BSA 1.98 m2    Body Mass Index 32.78 kg/m2  HI      General:  Alert and oriented, No acute distress.    Eye:  Pupils are equal, round and reactive to light, Extraocular movements are intact, Normal conjunctiva.    HENT:  Normocephalic, Tympanic membranes are clear, Oral mucosa is moist, No pharyngeal erythema.    Neck:  Supple, Non-tender, No lymphadenopathy, No thyromegaly.    Respiratory:  Lungs are clear to auscultation.    Cardiovascular:  Normal rate, Regular rhythm.    Breast:  No mass, No tenderness, No discharge.    Gastrointestinal:  Soft, Non-tender, Non-distended, No organomegaly.    Genitourinary:  Normal genitalia for age and sex, No urethral discharge, No lesions.         Perineum: Within normal limits.         Vagina: Within normal limits.         Uterus: Within normal limits.         Ovaries: Within normal limits.         Adnexa: Within normal limits.    Musculoskeletal:  Normal range of motion, Normal strength.    Integumentary:  Warm, Dry, Pink, No rash, no concerning lesions, acne on face primarily closed comedones but some firm larger nodules as well.    Neurologic:  Alert, Oriented, Normal sensory.    Psychiatric:  Cooperative, Appropriate mood & affect.       Impression and Plan   Diagnosis     Screening for STD (sexually transmitted disease) (NWU54-XD Z11.3).     Encounter for screening for cervical cancer (BSA56-XU Z12.4).     Well adult exam (QPV37-DP Z00.00).     Course:  Progressing as expected.    Patient Instructions:       Counseled: Patient, BMI, diet, and exercise.    Orders     Orders (Selected)   Outpatient Orders  Ordered  Return to Clinic (Request): RFV: Annual px, Return in 1 year  Ordered (In Transit)  Chlamydia/Neisseria gonorrhoeae RNA, TMA* ([facility]): Specimen Type: Swab, Collection  Date: 07/15/20 11:54:00 CDT  ThinPrep Imaging Pap reflex HPV mRNA E6/E7* ([facility]): Specimen Type: Pap, Collection Date: 07/15/20 11:53:00 CDT.     Diagnosis     Cystic acne vulgaris (JEV84-KY L70.0).     Course:  will trial spironolactone after discussing options; check chem 8 and if normal repeat chem 8 in 3 months and then annually, will let me know if not effective.    Orders     Orders (Selected)   Outpatient Orders  Ordered  Return to Clinic (Request): RFV: Annual px, Return in 1 year  Prescriptions  Prescribed  spironolactone 50 mg oral tablet: = 1 tab(s) ( 50 mg ), Oral, bid, # 180 tab(s), 3 Refill(s), Type: Maintenance, [facility], 1 tab(s) Oral bid, 64, in, 07/15/20 11:05:00 CDT, Height Measured, 191, lb, 07/15/20 11:05:00 CDT, Weight Measured.

## 2022-02-15 NOTE — TELEPHONE ENCOUNTER
---------------------  From: Gloria Gomez LPN ([facility] Message Pool ([id number]_[facility]))   To: Referral Coordinators Pool ([id number]_[facility]);     Sent: 10/11/2021 3:20:30 PM CDT  Subject: Imaging Order       An order has been placed for a pelvic ultrasound.

## 2022-03-02 NOTE — TELEPHONE ENCOUNTER
---------------------  From: Shawna/Fang GLORIA (Phone Messages Pool ([id number]_[place] [facility]))   To: CHERIE RODRÍGUEZ    Sent: 1/5/2022 1:32:00 PM CST  Subject: FW: Ultrasound     Hi Cherie,    I re faxed the order. [facility] should have received this now.    Fang KRISHNA. CMA         ---------------------  From: CHERIE RODRÍGUEZ  To: [facility]  Sent: 01/05/2022 01:12 p.m. CST  Subject: Ultrasound  Hi, I was wondering about getting a repeat ultrasound order sent over to the [facility]. I called to schedule an appointment but there hadn t been an order sent yet.  Thank you!  Cherie Rodríguez

## 2022-03-02 NOTE — TELEPHONE ENCOUNTER
---------------------  From: Geovanni Tyler MD   Sent: 2/7/2022 3:56:54 PM CST  Subject: US results     Called patient with results. Given persistent intermittent symptoms, will follow up with obgyn. She would like to check with friends about who they would recommend. Will let me know if she needs help getting scheduled or would like records sent.

## 2022-10-03 ENCOUNTER — HEALTH MAINTENANCE LETTER (OUTPATIENT)
Age: 27
End: 2022-10-03

## 2022-11-02 ENCOUNTER — TRANSFERRED RECORDS (OUTPATIENT)
Dept: HEALTH INFORMATION MANAGEMENT | Facility: CLINIC | Age: 27
End: 2022-11-02

## 2022-11-09 ENCOUNTER — MYC MEDICAL ADVICE (OUTPATIENT)
Dept: FAMILY MEDICINE | Facility: CLINIC | Age: 27
End: 2022-11-09

## 2022-11-09 DIAGNOSIS — Z13.1 SCREENING FOR DIABETES MELLITUS: Primary | ICD-10-CM

## 2022-11-09 DIAGNOSIS — R53.83 OTHER FATIGUE: ICD-10-CM

## 2022-11-09 DIAGNOSIS — Z13.6 SCREENING FOR CARDIOVASCULAR CONDITION: ICD-10-CM

## 2022-11-11 ENCOUNTER — LAB (OUTPATIENT)
Dept: LAB | Facility: CLINIC | Age: 27
End: 2022-11-11
Payer: COMMERCIAL

## 2022-11-11 DIAGNOSIS — R53.83 OTHER FATIGUE: ICD-10-CM

## 2022-11-11 DIAGNOSIS — Z13.1 SCREENING FOR DIABETES MELLITUS: ICD-10-CM

## 2022-11-11 DIAGNOSIS — Z13.6 SCREENING FOR CARDIOVASCULAR CONDITION: ICD-10-CM

## 2022-11-11 LAB
ALBUMIN SERPL BCG-MCNC: 4.4 G/DL (ref 3.5–5.2)
ALP SERPL-CCNC: 94 U/L (ref 35–104)
ALT SERPL W P-5'-P-CCNC: 22 U/L (ref 10–35)
ANION GAP SERPL CALCULATED.3IONS-SCNC: 13 MMOL/L (ref 7–15)
AST SERPL W P-5'-P-CCNC: 26 U/L (ref 10–35)
BILIRUB SERPL-MCNC: 0.4 MG/DL
BUN SERPL-MCNC: 7.4 MG/DL (ref 6–20)
CALCIUM SERPL-MCNC: 8.9 MG/DL (ref 8.6–10)
CHLORIDE SERPL-SCNC: 104 MMOL/L (ref 98–107)
CHOLEST SERPL-MCNC: 146 MG/DL
CREAT SERPL-MCNC: 0.68 MG/DL (ref 0.51–0.95)
DEPRECATED HCO3 PLAS-SCNC: 23 MMOL/L (ref 22–29)
GFR SERPL CREATININE-BSD FRML MDRD: >90 ML/MIN/1.73M2
GLUCOSE SERPL-MCNC: 92 MG/DL (ref 70–99)
HDLC SERPL-MCNC: 44 MG/DL
LDLC SERPL CALC-MCNC: 83 MG/DL
NONHDLC SERPL-MCNC: 102 MG/DL
POTASSIUM SERPL-SCNC: 4.9 MMOL/L (ref 3.4–5.3)
PROT SERPL-MCNC: 7.6 G/DL (ref 6.4–8.3)
SODIUM SERPL-SCNC: 140 MMOL/L (ref 136–145)
TRIGL SERPL-MCNC: 94 MG/DL
TSH SERPL DL<=0.005 MIU/L-ACNC: 1.27 UIU/ML (ref 0.3–4.2)

## 2022-11-11 PROCEDURE — 84443 ASSAY THYROID STIM HORMONE: CPT

## 2022-11-11 PROCEDURE — 80061 LIPID PANEL: CPT

## 2022-11-11 PROCEDURE — 36415 COLL VENOUS BLD VENIPUNCTURE: CPT

## 2022-11-11 PROCEDURE — 80053 COMPREHEN METABOLIC PANEL: CPT

## 2022-12-07 ENCOUNTER — TRANSFERRED RECORDS (OUTPATIENT)
Dept: HEALTH INFORMATION MANAGEMENT | Facility: CLINIC | Age: 27
End: 2022-12-07

## 2022-12-08 ENCOUNTER — OFFICE VISIT (OUTPATIENT)
Dept: FAMILY MEDICINE | Facility: CLINIC | Age: 27
End: 2022-12-08
Payer: COMMERCIAL

## 2022-12-08 VITALS
BODY MASS INDEX: 35.17 KG/M2 | HEIGHT: 63 IN | RESPIRATION RATE: 12 BRPM | WEIGHT: 198.5 LBS | OXYGEN SATURATION: 98 % | SYSTOLIC BLOOD PRESSURE: 120 MMHG | HEART RATE: 103 BPM | DIASTOLIC BLOOD PRESSURE: 74 MMHG

## 2022-12-08 DIAGNOSIS — Z11.4 SCREENING FOR HIV (HUMAN IMMUNODEFICIENCY VIRUS): ICD-10-CM

## 2022-12-08 DIAGNOSIS — Z11.59 NEED FOR HEPATITIS C SCREENING TEST: ICD-10-CM

## 2022-12-08 DIAGNOSIS — Z00.00 ROUTINE GENERAL MEDICAL EXAMINATION AT A HEALTH CARE FACILITY: Primary | ICD-10-CM

## 2022-12-08 DIAGNOSIS — Z11.3 SCREEN FOR STD (SEXUALLY TRANSMITTED DISEASE): ICD-10-CM

## 2022-12-08 PROBLEM — Q51.3 BICORNATE UTERUS: Status: ACTIVE | Noted: 2022-08-08

## 2022-12-08 LAB
HCV AB SERPL QL IA: NONREACTIVE
HIV 1+2 AB+HIV1 P24 AG SERPL QL IA: NONREACTIVE

## 2022-12-08 PROCEDURE — 36415 COLL VENOUS BLD VENIPUNCTURE: CPT | Performed by: FAMILY MEDICINE

## 2022-12-08 PROCEDURE — 99395 PREV VISIT EST AGE 18-39: CPT | Performed by: FAMILY MEDICINE

## 2022-12-08 PROCEDURE — 87389 HIV-1 AG W/HIV-1&-2 AB AG IA: CPT | Performed by: FAMILY MEDICINE

## 2022-12-08 PROCEDURE — 87591 N.GONORRHOEAE DNA AMP PROB: CPT | Performed by: FAMILY MEDICINE

## 2022-12-08 PROCEDURE — 87491 CHLMYD TRACH DNA AMP PROBE: CPT | Performed by: FAMILY MEDICINE

## 2022-12-08 PROCEDURE — 86803 HEPATITIS C AB TEST: CPT | Performed by: FAMILY MEDICINE

## 2022-12-08 RX ORDER — NICOTINE POLACRILEX 2 MG
GUM BUCCAL
COMMUNITY
Start: 2022-08-08

## 2022-12-08 ASSESSMENT — ENCOUNTER SYMPTOMS
WEAKNESS: 0
SORE THROAT: 0
SHORTNESS OF BREATH: 0
HEMATURIA: 0
JOINT SWELLING: 0
DIZZINESS: 0
CONSTIPATION: 0
BREAST MASS: 0
FREQUENCY: 0
HEARTBURN: 0
ARTHRALGIAS: 0
EYE PAIN: 0
FEVER: 0
DYSURIA: 0
ENDOCRINE NEGATIVE: 1
ABDOMINAL PAIN: 0
HEMATOCHEZIA: 0
NAUSEA: 0
COUGH: 1
HEMATOLOGIC/LYMPHATIC NEGATIVE: 1
ALLERGIC/IMMUNOLOGIC NEGATIVE: 1
PARESTHESIAS: 0
CHILLS: 0
DIARRHEA: 0
NERVOUS/ANXIOUS: 0
PALPITATIONS: 0
HEADACHES: 0
MYALGIAS: 0

## 2022-12-08 NOTE — PROGRESS NOTES
SUBJECTIVE:   CC: Cherie is an 27 year old who presents for preventive health visit.   Patient has been advised of split billing requirements and indicates understanding: Yes     Healthy Habits:     Getting at least 3 servings of Calcium per day:  Yes    Bi-annual eye exam:  Yes    Dental care twice a year:  Yes    Sleep apnea or symptoms of sleep apnea:  Daytime drowsiness and Excessive snoring    Diet:  Regular (no restrictions)    Frequency of exercise:  2-3 days/week    Duration of exercise:  30-45 minutes    Taking medications regularly:  Yes    Barriers to taking medications:  Not applicable    Medication side effects:  None    PHQ-2 Total Score: 1    Additional concerns today:  No      Today's PHQ-2 Score:   PHQ-2 ( 1999 [employer]) 12/8/2022   Q1: Little interest or pleasure in doing things 1   Q2: Feeling down, depressed or hopeless 0   PHQ-2 Score 1   Q1: Little interest or pleasure in doing things Several days   Q2: Feeling down, depressed or hopeless Not at all   PHQ-2 Score 1       Have you ever done Advance Care Planning? (For example, a Health Directive, POLST, or a discussion with a medical provider or your loved ones about your wishes): No, advance care planning information given to patient to review.  Patient declined advance care planning discussion at this time.    Social History     Tobacco Use     Smoking status: Never     Smokeless tobacco: Never   Substance Use Topics     Alcohol use: Not on file     If you drink alcohol do you typically have >3 drinks per day or >7 drinks per week? No    Alcohol Use 12/8/2022   Prescreen: >3 drinks/day or >7 drinks/week? No   Prescreen: >3 drinks/day or >7 drinks/week? -       Reviewed orders with patient.  Reviewed health maintenance and updated orders accordingly - Yes      Breast Cancer Screening:    FHS-7:   Breast CA Risk Assessment (FHS-7) 12/8/2022   Did any of your first-degree relatives have breast or ovarian cancer? No   Did any of your relatives  have bilateral breast cancer? Unknown   Did any man in your family have breast cancer? No   Did any woman in your family have breast and ovarian cancer? Yes   Did any woman in your family have breast cancer before age 50 y? Yes   Do you have 2 or more relatives with breast and/or ovarian cancer? Unknown   Do you have 2 or more relatives with breast and/or bowel cancer? Yes       Patient under 40 years of age: Routine Mammogram Screening not recommended.   Pertinent mammograms are reviewed under the imaging tab.    History of abnormal Pap smear: NO - age 21-29 PAP every 3 years recommended     Reviewed and updated as needed this visit by clinical staff   Tobacco  Allergies  Meds  Problems  Med Hx  Surg Hx  Fam Hx          Reviewed and updated as needed this visit by Provider   Tobacco  Allergies  Meds  Problems  Med Hx  Surg Hx  Fam Hx             Review of Systems   Constitutional: Negative for chills and fever.   HENT: Positive for congestion. Negative for ear pain, hearing loss and sore throat.    Eyes: Negative for pain and visual disturbance.   Respiratory: Positive for cough. Negative for shortness of breath.    Cardiovascular: Negative for chest pain, palpitations and peripheral edema.   Gastrointestinal: Negative for abdominal pain, constipation, diarrhea, heartburn, hematochezia and nausea.   Endocrine: Negative.    Breasts:  Negative for tenderness, breast mass and discharge.   Genitourinary: Negative for dysuria, frequency, genital sores, hematuria, pelvic pain, urgency, vaginal bleeding and vaginal discharge.   Musculoskeletal: Negative for arthralgias, joint swelling and myalgias.   Skin: Negative for rash.   Allergic/Immunologic: Negative.    Neurological: Negative for dizziness, weakness, headaches and paresthesias.   Hematological: Negative.    Psychiatric/Behavioral: Negative for mood changes. The patient is not nervous/anxious.           OBJECTIVE:   /74   Pulse 103   Resp 12   "  1.605 m (5' 3.19\")   Wt 90 kg (198 lb 8 oz)   LMP 12/02/2022 (Exact Date)   SpO2 98%   BMI 34.95 kg/m    Physical Exam  GENERAL: healthy, alert and no distress  EYES: Eyes grossly normal to inspection, PERRL and conjunctivae and sclerae normal  HENT: ear canals and TM's normal, nose and mouth without ulcers or lesions  NECK: no adenopathy, no asymmetry, masses, or scars and thyroid normal to palpation  RESP: lungs clear to auscultation - no rales, rhonchi or wheezes  CV: regular rate and rhythm, normal S1 S2, no S3 or S4, no murmur, click or rub, no peripheral edema and peripheral pulses strong  ABDOMEN: soft, nontender, no hepatosplenomegaly, no masses and bowel sounds normal  MS: no gross musculoskeletal defects noted, no edema  SKIN: no suspicious lesions or rashes  NEURO: Normal strength and tone, mentation intact and speech normal  PSYCH: mentation appears normal, affect normal/bright        ASSESSMENT/PLAN:   Routine general medical examination at a health care facility  Health maintenance updated, preventive services reviewed, vaccines discussed, questions are answered, patient encouraged to continue annual wellness visits to review preventive services  Vaccinations up to date    Need for hepatitis C screening test  - Hepatitis C Screen Reflex to HCV RNA Quant and Genotype; Future  - Hepatitis C Screen Reflex to HCV RNA Quant and Genotype    Screening for HIV (human immunodeficiency virus)  - HIV Antigen Antibody Combo; Future  - HIV Antigen Antibody Combo    Screen for STD (sexually transmitted disease)  - Chlamydia trachomatis/Neisseria gonorrhoeae by PCR - Clinic Collect            COUNSELING:  Reviewed preventive health counseling, as reflected in patient instructions       Healthy diet/nutrition       Contraception       Colorectal Cancer Screening        She reports that she has never smoked. She has never used smokeless tobacco.      Geovanni Tyler MD  [facility] [place] [facility] [place]

## 2022-12-09 LAB
C TRACH DNA SPEC QL PROBE+SIG AMP: NEGATIVE
N GONORRHOEA DNA SPEC QL NAA+PROBE: NEGATIVE

## 2023-01-11 ENCOUNTER — TRANSFERRED RECORDS (OUTPATIENT)
Dept: HEALTH INFORMATION MANAGEMENT | Facility: CLINIC | Age: 28
End: 2023-01-11

## 2023-02-01 ENCOUNTER — TRANSFERRED RECORDS (OUTPATIENT)
Dept: HEALTH INFORMATION MANAGEMENT | Facility: CLINIC | Age: 28
End: 2023-02-01

## 2023-03-10 ENCOUNTER — TRANSFERRED RECORDS (OUTPATIENT)
Dept: HEALTH INFORMATION MANAGEMENT | Facility: CLINIC | Age: 28
End: 2023-03-10
Payer: COMMERCIAL

## 2023-03-28 ENCOUNTER — E-VISIT (OUTPATIENT)
Dept: FAMILY MEDICINE | Facility: CLINIC | Age: 28
End: 2023-03-28
Payer: COMMERCIAL

## 2023-03-28 DIAGNOSIS — J01.90 ACUTE BACTERIAL SINUSITIS: Primary | ICD-10-CM

## 2023-03-28 DIAGNOSIS — B96.89 ACUTE BACTERIAL SINUSITIS: Primary | ICD-10-CM

## 2023-03-28 PROCEDURE — 99421 OL DIG E/M SVC 5-10 MIN: CPT | Performed by: FAMILY MEDICINE

## 2023-03-28 NOTE — PATIENT INSTRUCTIONS
Dear Cherie Rodríguez    After reviewing your responses, I've been able to diagnose you with Acute bacterial sinusitis.      Based on your responses and diagnosis, I have prescribed   Orders Placed This Encounter     amoxicillin-clavulanate (AUGMENTIN) 875-125 MG tablet    to treat your symptoms. I have sent this to your pharmacy.?     It is also important to stay well hydrated, get lots of rest and take over-the-counter decongestants,?tylenol?or ibuprofen if you?are able to?take those medications per your primary care provider to help relieve discomfort.?     It is important that you take?all of?your prescribed medication even if your symptoms are improving after a few doses.? Taking?all of?your medicine helps prevent the symptoms from returning.?     If your symptoms worsen, you develop severe headache, vomiting, high fever (>102), or are not improving in 7 days, please contact your primary care provider for an appointment or visit any of our convenient Walk-in Care or Urgent Care Centers to be seen which can be found on our website?here.?     Thanks again for choosing?us?as your health care partner,?   ?  Geovanni Tyler MD?

## 2023-08-27 NOTE — PROGRESS NOTES
Patient:   KEVIN VILLA            MRN: [id number]            FIN: [id number]               Age:   21 years     Sex:  Female     :  1995   Associated Diagnoses:   Pap smear for cervical cancer screening; Screen for STD (sexually transmitted disease); Well adult exam   Author:   Geovanni Tyler MD      Visit Information   Visit type:  Annual exam.    Source of history:  Self.    History limitation:  None.       Chief Complaint   4/3/2017 2:10 PM CDT     Annual px,pap. Refill OCP      Well Adult History   Well Adult History             The patient presents for well adult exam.  The patient's general health status is described as good.  Pap smear today.  The patient's diet is described as balanced.  Exercise: routine.  Associated symptoms consist of none.  Last menstrual period: regular.  Medical encounters: none.  Compliance problems: stopped OCP when she ran out several months ago. Has noticed increased acne..        Review of Systems   All other systems reviewed and negative      Health Status   Allergies:    Allergic Reactions (Selected)  No Known Medication Allergies   Medications:  (Selected)   Prescriptions  Prescribed  drospirenone-ethinyl estradiol 3 mg-0.02 mg oral tablet: 1 tab(s), po, daily, # 84 tab(s), 3 Refill(s), Pharmacy: [facility], patient will notify pharmacy when refill is needed, 1 tab(s) po daily   Problem list:    All Problems  Obesity / SNOMED CT 8057653742 / Probable  Menarche / ICD-9-CM V21.8 / Confirmed  Canceled: Chicken Pox / ICD-9-.9      Histories   Past Medical History:    Active  Menarche (ICD-9-CM V21.8): Onset in the month of 3/2007 at 11 years   Family History:    Squamous cell carcinoma  Grandfather (P) (General Family Hx)  Basal cell carcinoma  Grandfather (P) (General Family Hx)  Coronary artery disease  Grandfather (M) (General Family Hx)     Procedure history:    No active procedure history items have been selected or recorded.   Social  Pt C/O BLE skin being itchy and painful, requesting an ATB, DO aware.         [address], RN  08/27/23 0461 History:        Alcohol Assessment                     Comments:                      01/28/2015 - [facility]                     Denies alcohol use.      Tobacco Assessment: Denies Tobacco Use            Never      Substance Abuse Assessment: Denies Substance Abuse            Never      Employment and Education Assessment            Student      Home and Environment Assessment            Lives with Roomate(s)/Friend(s).  Living situation: Home/Independent.      Nutrition and Health Assessment            Type of diet: Regular.      Exercise and Physical Activity Assessment: Occasional exercise            Exercise type: Walking.      Sexual Assessment            Sexually active: No.  Sexual orientation: Heterosexual.  Other sexual concerns: Birth control.      Other Assessment            First menses age 11.  Regular menses, duration 3 days.  No history of abnormal Pap smear.        Physical Examination   Last Menstrual Period: 3/8/2017   Vital Signs   4/3/2017 2:10 PM CDT Temperature Tympanic 98.8 DegF    Peripheral Pulse Rate 88 bpm    Pulse Site Radial artery    HR Method Manual    Systolic Blood Pressure 118 mmHg    Diastolic Blood Pressure 72 mmHg    Mean Arterial Pressure 87 mmHg    BP Site Right arm    BP Method Manual      Measurements from flowsheet : Measurements   4/3/2017 2:10 PM CDT Height Measured - Standard 63.5 in    Weight Measured - Standard 193 lb    BSA 1.98 m2    Body Mass Index 33.65 kg/m2      General:  Alert and oriented, No acute distress.    Eye:  Pupils are equal, round and reactive to light, Extraocular movements are intact, Normal conjunctiva.    HENT:  Normocephalic, Tympanic membranes are clear, Oral mucosa is moist, No pharyngeal erythema.    Neck:  Supple, Non-tender, No lymphadenopathy, No thyromegaly.    Respiratory:  Lungs are clear to auscultation.    Cardiovascular:  Normal rate, Regular rhythm.    Breast:  No mass, No tenderness, No discharge.    Gastrointestinal:   Soft, Non-tender, Non-distended, No organomegaly.    Genitourinary:  Normal genitalia for age and sex, No urethral discharge, No lesions.         Perineum: Within normal limits.         Vagina: Within normal limits.         Uterus: Within normal limits.         Ovaries: Within normal limits.         Adnexa: Within normal limits.    Musculoskeletal:  Normal range of motion, Normal strength.    Integumentary:  Warm, Dry, Pink, No rash, no concerning lesions.    Neurologic:  Alert, Oriented, Normal sensory.    Psychiatric:  Cooperative, Appropriate mood & affect.       Impression and Plan   Diagnosis     Pap smear for cervical cancer screening (FLV02-DJ Z12.4).     Screen for STD (sexually transmitted disease) (CXW48-BK Z11.3).     Well adult exam (MFZ42-BX Z00.00).     Course:  Progressing as expected.    Patient Instructions:       Counseled: Patient, BMI, diet, and exercise.    Orders     Orders (Selected)   Outpatient Orders  Ordered  Chlamydia and Gonoccocus Probe (Request): Screen for STD (sexually transmitted disease)  Return to Clinic (Request): RFV: Annual exam, Return in 1 year  Thin Prep PAP (Request): Pap smear for cervical cancer screening  Prescriptions  Prescribed  drospirenone-ethinyl estradiol 3 mg-0.02 mg oral tablet: 1 tab(s), po, daily, # 84 tab(s), 4 Refill(s), [facility], 1 tab(s) po daily.

## 2023-10-25 ENCOUNTER — TRANSFERRED RECORDS (OUTPATIENT)
Dept: HEALTH INFORMATION MANAGEMENT | Facility: CLINIC | Age: 28
End: 2023-10-25
Payer: COMMERCIAL

## 2023-12-05 ASSESSMENT — ENCOUNTER SYMPTOMS
WEAKNESS: 0
BREAST MASS: 0
DYSURIA: 0
FEVER: 0
HEARTBURN: 1
HEMATOCHEZIA: 0
NERVOUS/ANXIOUS: 0
HEMATURIA: 0
ARTHRALGIAS: 0
SHORTNESS OF BREATH: 0
CHILLS: 0
DIARRHEA: 0
DIZZINESS: 0
CONSTIPATION: 0
MYALGIAS: 0
FREQUENCY: 0
EYE PAIN: 0
HEADACHES: 1
ABDOMINAL PAIN: 0
SORE THROAT: 0
NAUSEA: 0
COUGH: 0
PARESTHESIAS: 0
PALPITATIONS: 0
JOINT SWELLING: 0

## 2023-12-06 ENCOUNTER — OFFICE VISIT (OUTPATIENT)
Dept: FAMILY MEDICINE | Facility: CLINIC | Age: 28
End: 2023-12-06
Payer: COMMERCIAL

## 2023-12-06 VITALS
WEIGHT: 201.2 LBS | DIASTOLIC BLOOD PRESSURE: 78 MMHG | OXYGEN SATURATION: 98 % | TEMPERATURE: 97.9 F | BODY MASS INDEX: 35.65 KG/M2 | HEIGHT: 63 IN | HEART RATE: 113 BPM | SYSTOLIC BLOOD PRESSURE: 126 MMHG | RESPIRATION RATE: 14 BRPM

## 2023-12-06 DIAGNOSIS — Z12.4 CERVICAL CANCER SCREENING: ICD-10-CM

## 2023-12-06 DIAGNOSIS — K21.9 GASTROESOPHAGEAL REFLUX DISEASE WITHOUT ESOPHAGITIS: ICD-10-CM

## 2023-12-06 DIAGNOSIS — Z00.00 ROUTINE GENERAL MEDICAL EXAMINATION AT A HEALTH CARE FACILITY: Primary | ICD-10-CM

## 2023-12-06 DIAGNOSIS — Z11.3 SCREEN FOR STD (SEXUALLY TRANSMITTED DISEASE): ICD-10-CM

## 2023-12-06 PROCEDURE — 99213 OFFICE O/P EST LOW 20 MIN: CPT | Mod: 25 | Performed by: FAMILY MEDICINE

## 2023-12-06 PROCEDURE — G0145 SCR C/V CYTO,THINLAYER,RESCR: HCPCS | Performed by: FAMILY MEDICINE

## 2023-12-06 PROCEDURE — 87491 CHLMYD TRACH DNA AMP PROBE: CPT | Performed by: FAMILY MEDICINE

## 2023-12-06 PROCEDURE — 99395 PREV VISIT EST AGE 18-39: CPT | Performed by: FAMILY MEDICINE

## 2023-12-06 PROCEDURE — 87591 N.GONORRHOEAE DNA AMP PROB: CPT | Performed by: FAMILY MEDICINE

## 2023-12-06 ASSESSMENT — ENCOUNTER SYMPTOMS
HEARTBURN: 1
WEAKNESS: 0
DYSURIA: 0
SORE THROAT: 0
HEMATOCHEZIA: 0
FEVER: 0
NERVOUS/ANXIOUS: 0
CHILLS: 0
ARTHRALGIAS: 0
NAUSEA: 0
PALPITATIONS: 0
DIARRHEA: 0
COUGH: 0
FREQUENCY: 0
BREAST MASS: 0
JOINT SWELLING: 0
PARESTHESIAS: 0
CONSTIPATION: 0
SHORTNESS OF BREATH: 0
HEMATURIA: 0
MYALGIAS: 0
ABDOMINAL PAIN: 0
DIZZINESS: 0
HEADACHES: 1
EYE PAIN: 0

## 2023-12-06 NOTE — PROGRESS NOTES
SUBJECTIVE:   Cherie is a 28 year old, presenting for the following:  Physical (Pap w/ STD screening/), Snoring (Has had sleep study in 2021, at home. /Patient willing to do in clinic. ), and Gastrophageal Reflux (Worse - after certain foods, and alcohol.)        12/6/2023     5:20 PM   Additional Questions   Roomed by Geovanna PELAEZ CMA   Accompanied by cheo       Healthy Habits:     Getting at least 3 servings of Calcium per day:  Yes    Bi-annual eye exam:  Yes    Dental care twice a year:  Yes    Sleep apnea or symptoms of sleep apnea:  Daytime drowsiness and Excessive snoring    Diet:  Regular (no restrictions)    Frequency of exercise:  2-3 days/week    Duration of exercise:  30-45 minutes    Taking medications regularly:  Yes    Barriers to taking medications:  None    Medication side effects:  None    Additional concerns today:  No    Patient noticing increase in heartburn. Acid reflux has been more significant. Some food is a trigger. No vomiting. No blood in stool.     Today's PHQ-2 Score:       12/5/2023     5:33 PM   PHQ-2 ( 1999 [employer])   Q1: Little interest or pleasure in doing things 1   Q2: Feeling down, depressed or hopeless 0   PHQ-2 Score 1   Q1: Little interest or pleasure in doing things Several days   Q2: Feeling down, depressed or hopeless Not at all   PHQ-2 Score 1       Social History     Tobacco Use    Smoking status: Never     Passive exposure: Never    Smokeless tobacco: Never   Substance Use Topics    Alcohol use: Yes             12/5/2023     5:31 PM   Alcohol Use   Prescreen: >3 drinks/day or >7 drinks/week? No     Reviewed orders with patient.  Reviewed health maintenance and updated orders accordingly - Yes      Breast Cancer Screening:    FHS-7:       12/8/2022     7:01 AM 12/5/2023     5:35 PM   Breast CA Risk Assessment (FHS-7)   Did any of your first-degree relatives have breast or ovarian cancer? No No   Did any of your relatives have bilateral breast cancer? Unknown Unknown  "  Did any man in your family have breast cancer? No No   Did any woman in your family have breast and ovarian cancer? Yes No   Did any woman in your family have breast cancer before age 50 y? Yes Yes   Do you have 2 or more relatives with breast and/or ovarian cancer? Unknown Yes   Do you have 2 or more relatives with breast and/or bowel cancer? Yes Yes       Patient under 40 years of age: Routine Mammogram Screening not recommended.   Pertinent mammograms are reviewed under the imaging tab.    History of abnormal Pap smear: NO - age 21-29 PAP every 3 years recommended     Reviewed and updated as needed this visit by clinical staff   Tobacco  Allergies  Meds  Problems  Med Hx  Surg Hx  Fam Hx  Soc   Hx        Reviewed and updated as needed this visit by Provider   Tobacco  Allergies  Meds  Problems  Med Hx  Surg Hx  Fam Hx             Review of Systems   Constitutional:  Negative for chills and fever.   HENT:  Positive for congestion. Negative for ear pain, hearing loss and sore throat.    Eyes:  Negative for pain and visual disturbance.   Respiratory:  Negative for cough and shortness of breath.    Cardiovascular:  Negative for chest pain, palpitations and peripheral edema.   Gastrointestinal:  Positive for heartburn. Negative for abdominal pain, constipation, diarrhea, hematochezia and nausea.   Breasts:  Negative for tenderness, breast mass and discharge.   Genitourinary:  Negative for dysuria, frequency, genital sores, hematuria, pelvic pain, urgency, vaginal bleeding and vaginal discharge.   Musculoskeletal:  Negative for arthralgias, joint swelling and myalgias.   Skin:  Negative for rash.   Neurological:  Positive for headaches. Negative for dizziness, weakness and paresthesias.   Psychiatric/Behavioral:  Negative for mood changes. The patient is not nervous/anxious.           OBJECTIVE:   /78   Pulse 113   Temp 97.9  F (36.6  C)   Resp 14   Ht 1.604 m (5' 3.15\")   Wt 91.3 kg (201 lb " 3.2 oz)   LMP 11/08/2023 (Approximate)   SpO2 98%   BMI 35.47 kg/m    Physical Exam  GENERAL: healthy, alert and no distress  EYES: Eyes grossly normal to inspection, PERRL and conjunctivae and sclerae normal  HENT: ear canals and TM's normal, nose and mouth without ulcers or lesions  NECK: no adenopathy, no asymmetry, masses, or scars and thyroid normal to palpation  RESP: lungs clear to auscultation - no rales, rhonchi or wheezes  CV: regular rate and rhythm, normal S1 S2, no S3 or S4, no murmur, click or rub, no peripheral edema and peripheral pulses strong  ABDOMEN: soft, nontender, no hepatosplenomegaly, no masses and bowel sounds normal   (female): normal female external genitalia, normal urethral meatus, vaginal mucosa pink, moist, well rugated, and normal cervix/adnexa/uterus without masses or discharge  MS: no gross musculoskeletal defects noted, no edema  SKIN: no suspicious lesions or rashes  NEURO: Normal strength and tone, mentation intact and speech normal  PSYCH: mentation appears normal, affect normal/bright        ASSESSMENT/PLAN:   Routine general medical examination at a health care facility  Health maintenance updated, preventive services reviewed, vaccines discussed, questions are answered, patient encouraged to continue annual wellness visits to review preventive services    Cervical cancer screening  Pap completed today  - Pap Screen reflex to HPV if ASCUS - recommend age 25 - 29    Gastroesophageal reflux disease without esophagitis  Will start PPI, recommend taking medication for 2-3 months, should improve over the next 2 weeks, follow up if not getting better  - omeprazole (PRILOSEC) 20 MG DR capsule; Take 1 capsule (20 mg) by mouth daily    Screen for STD (sexually transmitted disease)  Screening today  - NEISSERIA GONORRHOEA PCR; Future  - CHLAMYDIA TRACHOMATIS PCR; Future  - CHLAMYDIA TRACHOMATIS PCR  - NEISSERIA GONORRHOEA PCR            COUNSELING:  Reviewed preventive health  "counseling, as reflected in patient instructions      BMI:   Estimated body mass index is 35.47 kg/m  as calculated from the following:    Height as of this encounter: 1.604 m (5' 3.15\").    Weight as of this encounter: 91.3 kg (201 lb 3.2 oz).         She reports that she has never smoked. She has never been exposed to tobacco smoke. She has never used smokeless tobacco.          Geovanni Tyler MD  [facility]  "

## 2023-12-07 LAB
C TRACH DNA SPEC QL NAA+PROBE: NEGATIVE
N GONORRHOEA DNA SPEC QL NAA+PROBE: NEGATIVE

## 2023-12-11 LAB
BKR LAB AP GYN ADEQUACY: NORMAL
BKR LAB AP GYN INTERPRETATION: NORMAL
BKR LAB AP HPV REFLEX: NORMAL
BKR LAB AP LMP: NORMAL
BKR LAB AP PREVIOUS ABNORMAL: NORMAL
PATH REPORT.COMMENTS IMP SPEC: NORMAL
PATH REPORT.COMMENTS IMP SPEC: NORMAL
PATH REPORT.RELEVANT HX SPEC: NORMAL

## 2023-12-13 PROBLEM — R87.810 ASCUS WITH POSITIVE HIGH RISK HPV CERVICAL: Status: RESOLVED | Noted: 2023-12-13 | Resolved: 2023-12-13

## 2023-12-13 PROBLEM — R87.610 ASCUS WITH POSITIVE HIGH RISK HPV CERVICAL: Status: RESOLVED | Noted: 2023-12-13 | Resolved: 2023-12-13

## 2023-12-13 PROBLEM — R87.610 ASCUS WITH POSITIVE HIGH RISK HPV CERVICAL: Status: ACTIVE | Noted: 2023-12-13

## 2023-12-13 PROBLEM — R87.810 ASCUS WITH POSITIVE HIGH RISK HPV CERVICAL: Status: ACTIVE | Noted: 2023-12-13

## 2024-03-13 ENCOUNTER — TRANSFERRED RECORDS (OUTPATIENT)
Dept: HEALTH INFORMATION MANAGEMENT | Facility: CLINIC | Age: 29
End: 2024-03-13

## 2024-05-09 ENCOUNTER — OFFICE VISIT (OUTPATIENT)
Dept: FAMILY MEDICINE | Facility: CLINIC | Age: 29
End: 2024-05-09
Payer: COMMERCIAL

## 2024-05-09 VITALS
HEIGHT: 63 IN | SYSTOLIC BLOOD PRESSURE: 124 MMHG | RESPIRATION RATE: 14 BRPM | BODY MASS INDEX: 36.94 KG/M2 | TEMPERATURE: 98.1 F | DIASTOLIC BLOOD PRESSURE: 76 MMHG | OXYGEN SATURATION: 99 % | HEART RATE: 80 BPM | WEIGHT: 208.5 LBS

## 2024-05-09 DIAGNOSIS — E01.0 THYROMEGALY: ICD-10-CM

## 2024-05-09 DIAGNOSIS — D72.829 LEUKOCYTOSIS, UNSPECIFIED TYPE: ICD-10-CM

## 2024-05-09 DIAGNOSIS — L65.9 HAIR THINNING: ICD-10-CM

## 2024-05-09 DIAGNOSIS — R21 RASH: Primary | ICD-10-CM

## 2024-05-09 LAB
BASOPHILS # BLD AUTO: 0 10E3/UL (ref 0–0.2)
BASOPHILS NFR BLD AUTO: 0 %
EOSINOPHIL # BLD AUTO: 0.1 10E3/UL (ref 0–0.7)
EOSINOPHIL NFR BLD AUTO: 1 %
ERYTHROCYTE [DISTWIDTH] IN BLOOD BY AUTOMATED COUNT: 12.8 % (ref 10–15)
HCT VFR BLD AUTO: 42.3 % (ref 35–47)
HGB BLD-MCNC: 13.9 G/DL (ref 11.7–15.7)
IMM GRANULOCYTES # BLD: 0 10E3/UL
IMM GRANULOCYTES NFR BLD: 0 %
LYMPHOCYTES # BLD AUTO: 3.4 10E3/UL (ref 0.8–5.3)
LYMPHOCYTES NFR BLD AUTO: 27 %
MCH RBC QN AUTO: 28.1 PG (ref 26.5–33)
MCHC RBC AUTO-ENTMCNC: 32.9 G/DL (ref 31.5–36.5)
MCV RBC AUTO: 86 FL (ref 78–100)
MONOCYTES # BLD AUTO: 1 10E3/UL (ref 0–1.3)
MONOCYTES NFR BLD AUTO: 8 %
NEUTROPHILS # BLD AUTO: 8.3 10E3/UL (ref 1.6–8.3)
NEUTROPHILS NFR BLD AUTO: 65 %
PLATELET # BLD AUTO: 326 10E3/UL (ref 150–450)
RBC # BLD AUTO: 4.95 10E6/UL (ref 3.8–5.2)
WBC # BLD AUTO: 12.8 10E3/UL (ref 4–11)

## 2024-05-09 PROCEDURE — 85025 COMPLETE CBC W/AUTO DIFF WBC: CPT | Mod: QW | Performed by: NURSE PRACTITIONER

## 2024-05-09 PROCEDURE — 82306 VITAMIN D 25 HYDROXY: CPT | Performed by: NURSE PRACTITIONER

## 2024-05-09 PROCEDURE — 36415 COLL VENOUS BLD VENIPUNCTURE: CPT | Performed by: NURSE PRACTITIONER

## 2024-05-09 PROCEDURE — 82728 ASSAY OF FERRITIN: CPT | Performed by: NURSE PRACTITIONER

## 2024-05-09 PROCEDURE — 99214 OFFICE O/P EST MOD 30 MIN: CPT | Performed by: NURSE PRACTITIONER

## 2024-05-09 PROCEDURE — 84443 ASSAY THYROID STIM HORMONE: CPT | Performed by: NURSE PRACTITIONER

## 2024-05-09 PROCEDURE — 86003 ALLG SPEC IGE CRUDE XTRC EA: CPT | Performed by: NURSE PRACTITIONER

## 2024-05-09 NOTE — PROGRESS NOTES
"  Assessment & Plan     Rash  She has new onset intermittent migratory itchy rash onset 3 weeks ago with erythema and papules.  She shows pictures on her phone.  Signs of dermatographism, likely urticaria.  She wonders if she could have a food allergy.  There has been an allergy.  She has no GI symptoms, lip swelling, tongue swelling or shortness of breath with foods.  She is interested in testing and would like to proceed with food allergy panel.  - Allergy adult food panel    Hair thinning  Recommend lab work below to rule out thyroid dysfunction, anemia or vitamin D deficiency as cause of hair thinning.  Takes biotin supplement intermittently, has not been taking recently so should not interfere with TSH lab.  - TSH with free T4 reflex  - CBC with Platelets & Differential  - 25 OH Vit D therapy monitoring  - Ferritin    Thyromegaly  Slightly enlarged thyroid on palpation, possible nodule on left.  No tenderness on exam.  Recommend proceeding with thyroid US.   - US Thyroid; Future            BMI  Estimated body mass index is 36.76 kg/m  as calculated from the following:    Height as of this encounter: 1.604 m (5' 3.15\").    Weight as of this encounter: 94.6 kg (208 lb 8 oz).             Kirk Crespo is a 28 year old, presenting for the following health issues:  Derm Problem (Rash flare ups, and itchy bumps x 3 weeks also hair thinning x 6 months)        5/9/2024     5:16 PM   Additional Questions   Roomed by JUANI Vu     Cherie is a very pleasant 28-year-old female patient of Dr. Tyler who presents today for pruritic rash onset 3 weeks ago and also for evaluation of hair thinning.  Erythematous, papular and pruritic rash migrates, shows up randomly and last for 5 to 10 minutes.  She has a rash intermittently on the upper arms, chest and legs.  No pustules or vesicles.  She denies any new foods, detergents, lotions or other beauty products.  No new exposures at work.  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  She is taking Zyrtec during the day and Benadryl at night.    Does have history of mild eczema, patches on her elbows in the wintertime.    Denies any shortness of breath, swelling of lips, mouth or tongue with foods.  No particular food pattern.    She has noticed thinning hair for the last 6 months.  Reports her mom also has thin hair and her dad is receding hairline.  TSH checked in 2022 and within normal limits.  She has been on biotin on and off for couple years but has not taken recently so should not interfere with TSH lab.      History of Present Illness       Reason for visit:  Rash flare ups  Symptom onset:  3-4 weeks ago  Symptom intensity:  Mild  Symptom progression:  Staying the same  Had these symptoms before:  No    She eats 2-3 servings of fruits and vegetables daily.She consumes 0 sweetened beverage(s) daily.She exercises with enough effort to increase her heart rate 20 to 29 minutes per day.  She exercises with enough effort to increase her heart rate 3 or less days per week.   She is taking medications regularly.                 Review of Systems  Constitutional, neuro, ENT, endocrine, pulmonary, cardiac, gastrointestinal, genitourinary, musculoskeletal, integument and psychiatric systems are negative, except as otherwise noted.      Objective    /76 (BP Location: Right arm, Patient Position: Sitting, Cuff Size: Adult Large)   Pulse 80   Temp 98.1  F (36.7  C) (Tympanic)   Resp 14   Ht 1.604 m (5' 3.15\")   Wt 94.6 kg (208 lb 8 oz)   SpO2 99%   BMI 36.76 kg/m    Body mass index is 36.76 kg/m .  Physical Exam  Constitutional:       Appearance: Normal appearance.   HENT:      Head: Normocephalic and atraumatic.      Right Ear: Tympanic membrane normal.      Left Ear: Tympanic membrane normal.      Mouth/Throat:      Mouth: Mucous membranes are moist.   Eyes:      Pupils: Pupils are equal, round, and reactive to light.   Neck:      Comments: thyromegaly  Cardiovascular:      Rate " and Rhythm: Normal rate and regular rhythm.   Pulmonary:      Effort: Pulmonary effort is normal.      Breath sounds: Normal breath sounds.   Abdominal:      General: Abdomen is flat. Bowel sounds are normal.      Palpations: Abdomen is soft.   Lymphadenopathy:      Cervical: No cervical adenopathy.   Skin:     General: Skin is warm and dry.      Comments: Mild hair thinning, no alopecia or bald spots   Neurological:      Mental Status: She is alert and oriented to person, place, and time.   Psychiatric:         Mood and Affect: Mood normal.         Behavior: Behavior normal.                    Signed Electronically by: JAMILAH Mitchell CNP

## 2024-05-10 LAB
FERRITIN SERPL-MCNC: 55 NG/ML (ref 6–175)
TSH SERPL DL<=0.005 MIU/L-ACNC: 1.08 UIU/ML (ref 0.3–4.2)

## 2024-05-13 LAB
ALMOND IGE QN: <0.1 KU(A)/L
CASHEW NUT IGE QN: <0.1 KU(A)/L
CODFISH IGE QN: <0.1 KU(A)/L
COW MILK IGE QN: <0.1 KU(A)/L
EGG WHITE IGE QN: <0.1 KU(A)/L
HAZELNUT IGE QN: <0.1 KU(A)/L
IGE SERPL-ACNC: 22 KU/L (ref 0–114)
PEANUT IGE QN: <0.1 KU(A)/L
SALMON IGE QN: <0.1 KU(A)/L
SCALLOP IGE QN: <0.1 KU(A)/L
SESAME SEED IGE QN: <0.1 KU(A)/L
SHRIMP IGE QN: <0.1 KU(A)/L
SOYBEAN IGE QN: <0.1 KU(A)/L
TUNA IGE QN: <0.1 KU(A)/L
WALNUT IGE QN: <0.1 KU(A)/L
WHEAT IGE QN: <0.1 KU(A)/L

## 2024-05-15 LAB
DEPRECATED CALCIDIOL+CALCIFEROL SERPL-MC: <34 UG/L (ref 20–75)
VITAMIN D2 SERPL-MCNC: <5 UG/L
VITAMIN D3 SERPL-MCNC: 29 UG/L

## 2025-01-19 ENCOUNTER — HEALTH MAINTENANCE LETTER (OUTPATIENT)
Age: 30
End: 2025-01-19

## 2025-06-23 SDOH — HEALTH STABILITY: PHYSICAL HEALTH: ON AVERAGE, HOW MANY DAYS PER WEEK DO YOU ENGAGE IN MODERATE TO STRENUOUS EXERCISE (LIKE A BRISK WALK)?: 2 DAYS

## 2025-06-23 SDOH — HEALTH STABILITY: PHYSICAL HEALTH: ON AVERAGE, HOW MANY MINUTES DO YOU ENGAGE IN EXERCISE AT THIS LEVEL?: 30 MIN

## 2025-06-23 ASSESSMENT — SOCIAL DETERMINANTS OF HEALTH (SDOH): HOW OFTEN DO YOU GET TOGETHER WITH FRIENDS OR RELATIVES?: MORE THAN THREE TIMES A WEEK

## 2025-06-25 ENCOUNTER — OFFICE VISIT (OUTPATIENT)
Dept: FAMILY MEDICINE | Facility: CLINIC | Age: 30
End: 2025-06-25
Payer: COMMERCIAL

## 2025-06-25 VITALS
OXYGEN SATURATION: 98 % | DIASTOLIC BLOOD PRESSURE: 64 MMHG | SYSTOLIC BLOOD PRESSURE: 124 MMHG | WEIGHT: 219.1 LBS | HEART RATE: 88 BPM | RESPIRATION RATE: 12 BRPM | HEIGHT: 63 IN | BODY MASS INDEX: 38.82 KG/M2 | TEMPERATURE: 98.1 F

## 2025-06-25 DIAGNOSIS — K62.5 BRBPR (BRIGHT RED BLOOD PER RECTUM): ICD-10-CM

## 2025-06-25 DIAGNOSIS — Z12.4 SCREENING FOR CERVICAL CANCER: ICD-10-CM

## 2025-06-25 DIAGNOSIS — L50.8 CHRONIC URTICARIA: ICD-10-CM

## 2025-06-25 DIAGNOSIS — Z00.00 ROUTINE GENERAL MEDICAL EXAMINATION AT A HEALTH CARE FACILITY: Primary | ICD-10-CM

## 2025-06-25 PROCEDURE — 99213 OFFICE O/P EST LOW 20 MIN: CPT | Mod: 25 | Performed by: FAMILY MEDICINE

## 2025-06-25 PROCEDURE — G0145 SCR C/V CYTO,THINLAYER,RESCR: HCPCS | Performed by: FAMILY MEDICINE

## 2025-06-25 PROCEDURE — 99395 PREV VISIT EST AGE 18-39: CPT | Performed by: FAMILY MEDICINE

## 2025-06-25 PROCEDURE — 3074F SYST BP LT 130 MM HG: CPT | Performed by: FAMILY MEDICINE

## 2025-06-25 PROCEDURE — 3078F DIAST BP <80 MM HG: CPT | Performed by: FAMILY MEDICINE

## 2025-06-25 PROCEDURE — 87624 HPV HI-RISK TYP POOLED RSLT: CPT | Performed by: FAMILY MEDICINE

## 2025-06-25 RX ORDER — PRENATAL VIT/IRON FUM/FOLIC AC 27MG-0.8MG
1 TABLET ORAL DAILY
COMMUNITY

## 2025-06-25 RX ORDER — CETIRIZINE HYDROCHLORIDE 10 MG/1
10 TABLET ORAL DAILY
COMMUNITY

## 2025-06-25 RX ORDER — FAMOTIDINE 40 MG/1
40 TABLET, FILM COATED ORAL DAILY
Qty: 90 TABLET | Refills: 1 | Status: SHIPPED | OUTPATIENT
Start: 2025-06-25

## 2025-06-25 RX ORDER — MONTELUKAST SODIUM 10 MG/1
10 TABLET ORAL AT BEDTIME
Qty: 90 TABLET | Refills: 1 | Status: SHIPPED | OUTPATIENT
Start: 2025-06-25

## 2025-06-25 NOTE — PATIENT INSTRUCTIONS
Patient Education   Preventive Care Advice   This is general advice given by our system to help you stay healthy. However, your care team may have specific advice just for you. Please talk to your care team about your preventive care needs.  Nutrition  Eat 5 or more servings of fruits and vegetables each day.  Try wheat bread, brown rice and whole grain pasta (instead of white bread, rice, and pasta).  Get enough calcium and vitamin D. Check the label on foods and aim for 100% of the RDA (recommended daily allowance).  Lifestyle  Exercise at least 150 minutes each week  (30 minutes a day, 5 days a week).  Do muscle strengthening activities 2 days a week. These help control your weight and prevent disease.  No smoking.  Wear sunscreen to prevent skin cancer.  Have a dental exam and cleaning every 6 months.  Yearly exams  See your health care team every year to talk about:  Any changes in your health.  Any medicines your care team has prescribed.  Preventive care, family planning, and ways to prevent chronic diseases.  Shots (vaccines)   HPV shots (up to age 26), if you've never had them before.  Hepatitis B shots (up to age 59), if you've never had them before.  COVID-19 shot: Get this shot when it's due.  Flu shot: Get a flu shot every year.  Tetanus shot: Get a tetanus shot every 10 years.  Pneumococcal, hepatitis A, and RSV shots: Ask your care team if you need these based on your risk.  Shingles shot (for age 50 and up)  General health tests  Diabetes screening:  Starting at age 35, Get screened for diabetes at least every 3 years.  If you are younger than age 35, ask your care team if you should be screened for diabetes.  Cholesterol test: At age 39, start having a cholesterol test every 5 years, or more often if advised.  Bone density scan (DEXA): At age 50, ask your care team if you should have this scan for osteoporosis (brittle bones).  Hepatitis C: Get tested at least once in your life.  STIs (sexually  transmitted infections)  Before age 24: Ask your care team if you should be screened for STIs.  After age 24: Get screened for STIs if you're at risk. You are at risk for STIs (including HIV) if:  You are sexually active with more than one person.  You don't use condoms every time.  You or a partner was diagnosed with a sexually transmitted infection.  If you are at risk for HIV, ask about PrEP medicine to prevent HIV.  Get tested for HIV at least once in your life, whether you are at risk for HIV or not.  Cancer screening tests  Cervical cancer screening: If you have a cervix, begin getting regular cervical cancer screening tests starting at age 21.  Breast cancer scan (mammogram): If you've ever had breasts, begin having regular mammograms starting at age 40. This is a scan to check for breast cancer.  Colon cancer screening: It is important to start screening for colon cancer at age 45.  Have a colonoscopy test every 10 years (or more often if you're at risk) Or, ask your provider about stool tests like a FIT test every year or Cologuard test every 3 years.  To learn more about your testing options, visit:   .  For help making a decision, visit:   https://bit.ly/qs86822.  Prostate cancer screening test: If you have a prostate, ask your care team if a prostate cancer screening test (PSA) at age 55 is right for you.  Lung cancer screening: If you are a current or former smoker ages 50 to 80, ask your care team if ongoing lung cancer screenings are right for you.  For informational purposes only. Not to replace the advice of your health care provider. Copyright   2023 [facility]. All rights reserved. Clinically reviewed by the [facility]. scrible [id number] - REV 01/24.  Learning About Stress  What is stress?     Stress is your body's response to a hard situation. Your body can have a physical, emotional, or mental response. Stress is a fact of life for most people, and it  affects everyone differently. What causes stress for you may not be stressful for someone else.  A lot of things can cause stress. You may feel stress when you go on a job interview, take a test, or run a race. This kind of short-term stress is normal and even useful. It can help you if you need to work hard or react quickly. For example, stress can help you finish an important job on time.  Long-term stress is caused by ongoing stressful situations or events. Examples of long-term stress include long-term health problems, ongoing problems at work, or conflicts in your family. Long-term stress can harm your health.  How does stress affect your health?  When you are stressed, your body responds as though you are in danger. It makes hormones that speed up your heart, make you breathe faster, and give you a burst of energy. This is called the fight-or-flight stress response. If the stress is over quickly, your body goes back to normal and no harm is done.  But if stress happens too often or lasts too long, it can have bad effects. Long-term stress can make you more likely to get sick, and it can make symptoms of some diseases worse. If you tense up when you are stressed, you may develop neck, shoulder, or low back pain. Stress is linked to high blood pressure and heart disease.  Stress also harms your emotional health. It can make you velez, tense, or depressed. Your relationships may suffer, and you may not do well at work or school.  What can you do to manage stress?  You can try these things to help manage stress:   Do something active. Exercise or activity can help reduce stress. Walking is a great way to get started. Even everyday activities such as housecleaning or yard work can help.  Try yoga or arnold chi. These techniques combine exercise and meditation. You may need some training at first to learn them.  Do something you enjoy. For example, listen to music or go to a movie. Practice your hobby or do volunteer  "work.  Meditate. This can help you relax, because you are not worrying about what happened before or what may happen in the future.  Do guided imagery. Imagine yourself in any setting that helps you feel calm. You can use online videos, books, or a teacher to guide you.  Do breathing exercises. For example:  From a standing position, bend forward from the waist with your knees slightly bent. Let your arms dangle close to the floor.  Breathe in slowly and deeply as you return to a standing position. Roll up slowly and lift your head last.  Hold your breath for just a few seconds in the standing position.  Breathe out slowly and bend forward from the waist.  Let your feelings out. Talk, laugh, cry, and express anger when you need to. Talking with supportive friends or family, a counselor, or a gato leader about your feelings is a healthy way to relieve stress. Avoid discussing your feelings with people who make you feel worse.  Write. It may help to write about things that are bothering you. This helps you find out how much stress you feel and what is causing it. When you know this, you can find better ways to cope.  What can you do to prevent stress?  You might try some of these things to help prevent stress:  Manage your time. This helps you find time to do the things you want and need to do.  Get enough sleep. Your body recovers from the stresses of the day while you are sleeping.  Get support. Your family, friends, and community can make a difference in how you experience stress.  Limit your news feed. Avoid or limit time on social media or news that may make you feel stressed.  Do something active. Exercise or activity can help reduce stress. Walking is a great way to get started.  Where can you learn more?  Go to https://www.PASSNFLY.net/patiented  Enter N032 in the search box to learn more about \"Learning About Stress.\"  Current as of: October 24, 2024  Content Version: 14.5 2024-2025 [employer].   Care instructions adapted under license by your healthcare professional. If you have questions about a medical condition or this instruction, always ask your healthcare professional. [employer] disclaims any warranty or liability for your use of this information.

## 2025-06-25 NOTE — PROGRESS NOTES
"Preventive Care Visit  [facility]  Geovanni Tyler MD, Family Medicine  Jun 25, 2025      Assessment & Plan     Assessment & Plan  Routing general medical examination at a health care facility  Health maintenance updated, preventive services reviewed, vaccines discussed, questions are answered, patient encouraged to continue annual wellness visits to review preventive services    Screening for cervical cancer  Pap smear completed today    BRBPR (bright red blood per rectum):  - Bright red rectal bleeding likely due to a small fissure or internal hemorrhoid, however no palpable mass, no pain that would be expected with fissure. No active external hemorrhoid observed. Persistent occurrence without clear explanation.  - Recommend consultation with GI to evaluate persistent bleeding and consider scoping to rule out other causes.    Chronic urticaria  -etiology unclear, recommend Zyrtec BID, add famotidine and Singulair. If not improving consider referral to allergist    Appointments  - Consultation with GI specialist to evaluate recurring rectal bleeding. Timing and location not specified.    Consent was obtained from the patient to use an AI documentation tool in the creation of this note.            BMI  Estimated body mass index is 38.63 kg/m  as calculated from the following:    Height as of this encounter: 1.604 m (5' 3.15\").    Weight as of this encounter: 99.4 kg (219 lb 1.6 oz).       Counseling  Appropriate preventive services were addressed with this patient via screening, questionnaire, or discussion as appropriate for fall prevention, nutrition, physical activity, Tobacco-use cessation, social engagement, weight loss and cognition.  Checklist reviewing preventive services available has been given to the patient.  Reviewed patient's diet, addressing concerns and/or questions.   She is at risk for lack of exercise and has been provided with information to increase physical " activity for the benefit of her well-being.   She is at risk for psychosocial distress and has been provided with information to reduce risk.             Kirk Crespo is a 29 year old, presenting for the following:  Physical, Rectal Problem (Hemorrhoids - Rectal Bleeding when wiping NOT in stool .), and Derm Problem (X 1 year )        6/25/2025     5:31 PM   Additional Questions   Roomed by Geovanna PELAEZ CMA   Accompanied by None        History of Present Illness-  Cherie Rodríguez, 29 years    Patient here for annual exam     Other concerns:  Rectal Bleeding  - History of hemorrhoids in the past  - Recent episode of rectal bleeding lasting 5 days, occurring a week or two ago  - Bleeding described as bright red and present on wiping  - No associated constipation, diarrhea, or changes in stool  - Stools reported as normal  - No significant pain during bowel movementsp  - Previous abnormal pap smear around 2017, followed by normal results in subsequent follow-ups  Hives  -symptoms present for the past year  -no clear etiology, no specific triggers  -has dermographism  Taking zyrtec daily which decreases symptoms slightly  Notes ongoing itching  Started prior to pregnancy and has not changed over the year  Cherie Rodríguez, 29 years    Rectal Bleeding  - History of hemorrhoids in the past  - Recent episode of rectal bleeding lasting 5 days, occurring a week or two ago  - Bleeding described as bright red and present on wiping  - No associated constipation, diarrhea, or changes in stool  - Stools reported as normal  - No significant pain during bowel movementsp  - Previous abnormal pap smear around 2017, followed by normal results in subsequent follow-ups      Advance Care Planning    Discussed advance care planning with patient; however, patient declined at this time.        6/23/2025   General Health   How would you rate your overall physical health? Good   Feel stress (tense, anxious, or unable to sleep) To  some extent   (!) STRESS CONCERN      6/23/2025   Nutrition   Three or more servings of calcium each day? (!) NO   Diet: Regular (no restrictions)   How many servings of fruit and vegetables per day? (!) 2-3   How many sweetened beverages each day? 0-1         6/23/2025   Exercise   Days per week of moderate/strenous exercise 2 days   Average minutes spent exercising at this level 30 min   (!) EXERCISE CONCERN      6/23/2025   Social Factors   Frequency of gathering with friends or relatives More than three times a week   Worry food won't last until get money to buy more No   Food not last or not have enough money for food? No   Do you have housing? (Housing is defined as stable permanent housing and does not include staying outside in a car, in a tent, in an abandoned building, in an overnight shelter, or couch-surfing.) Yes   Are you worried about losing your housing? No   Lack of transportation? No   Unable to get utilities (heat,electricity)? No         6/23/2025   Dental   Dentist two times every year? Yes         Today's PHQ-2 Score:       6/25/2025     9:29 AM   PHQ-2 ( 1999 [employer])   Q1: Little interest or pleasure in doing things 1   Q2: Feeling down, depressed or hopeless 1   PHQ-2 Score 2    Q1: Little interest or pleasure in doing things Several days   Q2: Feeling down, depressed or hopeless Several days   PHQ-2 Score 2       Patient-reported           6/23/2025   Substance Use   Alcohol more than 3/day or more than 7/wk No   Do you use any other substances recreationally? No     Social History     Tobacco Use    Smoking status: Never     Passive exposure: Never    Smokeless tobacco: Never   Vaping Use    Vaping status: Never Used   Substance Use Topics    Alcohol use: Yes    Drug use: Never           12/5/2023   LAST FHS-7 RESULTS   1st degree relative breast or ovarian cancer No   Any relative bilateral breast cancer Unknown   Any male have breast cancer No   Any ONE woman have BOTH breast AND ovarian  "cancer No   Any woman with breast cancer before 50yrs Yes   2 or more relatives with breast AND/OR ovarian cancer Yes   2 or more relatives with breast AND/OR bowel cancer Yes                6/23/2025   STI Screening   New sexual partner(s) since last STI/HIV test? No     History of abnormal Pap smear: YES - reflected in History and Health Maintenance accordingly        12/6/2023     5:37 PM   PAP / HPV   PAP Negative for Intraepithelial Lesion or Malignancy (NILM)            6/23/2025   Contraception/Family Planning   Questions about contraception or family planning No   What are your periods like? Not currently having periods        Reviewed and updated as needed this visit by Provider   Tobacco  Allergies  Meds  Problems  Med Hx  Surg Hx  Fam Hx                     Objective    Exam  /64   Pulse 88   Temp 98.1  F (36.7  C)   Resp 12   Ht 1.604 m (5' 3.15\")   Wt 99.4 kg (219 lb 1.6 oz)   LMP  (LMP Unknown)   SpO2 98%   Breastfeeding Yes   BMI 38.63 kg/m     Estimated body mass index is 38.63 kg/m  as calculated from the following:    Height as of this encounter: 1.604 m (5' 3.15\").    Weight as of this encounter: 99.4 kg (219 lb 1.6 oz).    Physical Exam  GENERAL: alert and no distress  EYES: Eyes grossly normal to inspection, PERRL and conjunctivae and sclerae normal  HENT: ear canals and TM's normal, nose and mouth without ulcers or lesions  NECK: no adenopathy, no asymmetry, masses, or scars  RESP: lungs clear to auscultation - no rales, rhonchi or wheezes  CV: regular rate and rhythm, normal S1 S2, no S3 or S4, no murmur, click or rub, no peripheral edema  ABDOMEN: soft, nontender, no hepatosplenomegaly, no masses and bowel sounds normal   (female) w/bimanual: normal female external genitalia, normal urethral meatus, normal vaginal mucosa, and normal cervix/adnexa/uterus without masses or discharge  RECTAL: normal sphincter tone, no rectal masses, no active hemorrhoids  MS: no gross " musculoskeletal defects noted, no edema  SKIN: no suspicious lesions or rashes  NEURO: Normal strength and tone, mentation intact and speech normal  PSYCH: mentation appears normal, affect normal/bright        Signed Electronically by: Geovanni Tyler MD

## 2025-06-26 LAB
HPV HR 12 DNA CVX QL NAA+PROBE: NEGATIVE
HPV16 DNA CVX QL NAA+PROBE: NEGATIVE
HPV18 DNA CVX QL NAA+PROBE: NEGATIVE
HUMAN PAPILLOMA VIRUS FINAL DIAGNOSIS: NORMAL

## 2025-06-27 ENCOUNTER — RESULTS FOLLOW-UP (OUTPATIENT)
Dept: OBGYN | Facility: CLINIC | Age: 30
End: 2025-06-27

## 2025-06-29 NOTE — PATIENT INSTRUCTIONS

## 2025-06-30 ENCOUNTER — PATIENT OUTREACH (OUTPATIENT)
Dept: CARE COORDINATION | Facility: CLINIC | Age: 30
End: 2025-06-30
Payer: COMMERCIAL

## 2025-06-30 LAB
BKR AP ASSOCIATED HPV REPORT: NORMAL
BKR LAB AP GYN ADEQUACY: NORMAL
BKR LAB AP GYN INTERPRETATION: NORMAL
BKR LAB AP PREVIOUS ABNORMAL: NORMAL
PATH REPORT.COMMENTS IMP SPEC: NORMAL
PATH REPORT.COMMENTS IMP SPEC: NORMAL
PATH REPORT.RELEVANT HX SPEC: NORMAL

## 2025-07-01 PROBLEM — R87.610 ASCUS WITH POSITIVE HIGH RISK HPV CERVICAL: Status: RESOLVED | Noted: 2023-12-13 | Resolved: 2023-12-13

## 2025-07-01 PROBLEM — R87.810 ASCUS WITH POSITIVE HIGH RISK HPV CERVICAL: Status: RESOLVED | Noted: 2023-12-13 | Resolved: 2023-12-13

## 2025-09-04 ENCOUNTER — TRANSFERRED RECORDS (OUTPATIENT)
Dept: ADMINISTRATIVE | Facility: CLINIC | Age: 30
End: 2025-09-04
Payer: COMMERCIAL